# Patient Record
Sex: FEMALE | Race: BLACK OR AFRICAN AMERICAN | NOT HISPANIC OR LATINO | URBAN - METROPOLITAN AREA
[De-identification: names, ages, dates, MRNs, and addresses within clinical notes are randomized per-mention and may not be internally consistent; named-entity substitution may affect disease eponyms.]

---

## 2023-10-11 ENCOUNTER — EMERGENCY (EMERGENCY)
Facility: HOSPITAL | Age: 33
LOS: 0 days | Discharge: HOME | End: 2023-10-11
Attending: EMERGENCY MEDICINE
Payer: MEDICAID

## 2023-10-11 VITALS
DIASTOLIC BLOOD PRESSURE: 83 MMHG | WEIGHT: 164.91 LBS | TEMPERATURE: 98 F | SYSTOLIC BLOOD PRESSURE: 151 MMHG | RESPIRATION RATE: 17 BRPM | HEART RATE: 89 BPM | OXYGEN SATURATION: 100 %

## 2023-10-11 VITALS
SYSTOLIC BLOOD PRESSURE: 113 MMHG | HEART RATE: 87 BPM | OXYGEN SATURATION: 100 % | DIASTOLIC BLOOD PRESSURE: 74 MMHG | RESPIRATION RATE: 18 BRPM | TEMPERATURE: 98 F

## 2023-10-11 DIAGNOSIS — O26.891 OTHER SPECIFIED PREGNANCY RELATED CONDITIONS, FIRST TRIMESTER: ICD-10-CM

## 2023-10-11 DIAGNOSIS — R10.30 LOWER ABDOMINAL PAIN, UNSPECIFIED: ICD-10-CM

## 2023-10-11 DIAGNOSIS — O23.41 UNSPECIFIED INFECTION OF URINARY TRACT IN PREGNANCY, FIRST TRIMESTER: ICD-10-CM

## 2023-10-11 DIAGNOSIS — Z3A.01 LESS THAN 8 WEEKS GESTATION OF PREGNANCY: ICD-10-CM

## 2023-10-11 DIAGNOSIS — O26.851 SPOTTING COMPLICATING PREGNANCY, FIRST TRIMESTER: ICD-10-CM

## 2023-10-11 LAB
ALBUMIN SERPL ELPH-MCNC: 4 G/DL — SIGNIFICANT CHANGE UP (ref 3.5–5.2)
ALP SERPL-CCNC: 116 U/L — HIGH (ref 30–115)
ALT FLD-CCNC: 9 U/L — SIGNIFICANT CHANGE UP (ref 0–41)
ANION GAP SERPL CALC-SCNC: 8 MMOL/L — SIGNIFICANT CHANGE UP (ref 7–14)
APPEARANCE UR: CLEAR — SIGNIFICANT CHANGE UP
AST SERPL-CCNC: 15 U/L — SIGNIFICANT CHANGE UP (ref 0–41)
BACTERIA # UR AUTO: ABNORMAL /HPF
BASOPHILS # BLD AUTO: 0.05 K/UL — SIGNIFICANT CHANGE UP (ref 0–0.2)
BASOPHILS NFR BLD AUTO: 0.7 % — SIGNIFICANT CHANGE UP (ref 0–1)
BILIRUB DIRECT SERPL-MCNC: <0.2 MG/DL — SIGNIFICANT CHANGE UP (ref 0–0.3)
BILIRUB INDIRECT FLD-MCNC: >0.2 MG/DL — SIGNIFICANT CHANGE UP (ref 0.2–1.2)
BILIRUB SERPL-MCNC: 0.4 MG/DL — SIGNIFICANT CHANGE UP (ref 0.2–1.2)
BILIRUB UR-MCNC: NEGATIVE — SIGNIFICANT CHANGE UP
BLD GP AB SCN SERPL QL: SIGNIFICANT CHANGE UP
BUN SERPL-MCNC: 5 MG/DL — LOW (ref 10–20)
CALCIUM SERPL-MCNC: 9.6 MG/DL — SIGNIFICANT CHANGE UP (ref 8.4–10.4)
CAST: 1 /LPF — SIGNIFICANT CHANGE UP (ref 0–4)
CHLORIDE SERPL-SCNC: 102 MMOL/L — SIGNIFICANT CHANGE UP (ref 98–110)
CO2 SERPL-SCNC: 22 MMOL/L — SIGNIFICANT CHANGE UP (ref 17–32)
COLOR SPEC: YELLOW — SIGNIFICANT CHANGE UP
CREAT SERPL-MCNC: 0.8 MG/DL — SIGNIFICANT CHANGE UP (ref 0.7–1.5)
DIFF PNL FLD: NEGATIVE — SIGNIFICANT CHANGE UP
EGFR: 100 ML/MIN/1.73M2 — SIGNIFICANT CHANGE UP
EOSINOPHIL # BLD AUTO: 0.11 K/UL — SIGNIFICANT CHANGE UP (ref 0–0.7)
EOSINOPHIL NFR BLD AUTO: 1.6 % — SIGNIFICANT CHANGE UP (ref 0–8)
GLUCOSE SERPL-MCNC: 93 MG/DL — SIGNIFICANT CHANGE UP (ref 70–99)
GLUCOSE UR QL: NEGATIVE MG/DL — SIGNIFICANT CHANGE UP
HCG SERPL-ACNC: 6757 MIU/ML — HIGH
HCT VFR BLD CALC: 36.2 % — LOW (ref 37–47)
HGB BLD-MCNC: 12.6 G/DL — SIGNIFICANT CHANGE UP (ref 12–16)
HYALINE CASTS # UR AUTO: 1 /LPF — SIGNIFICANT CHANGE UP (ref 0–4)
IMM GRANULOCYTES NFR BLD AUTO: 0.3 % — SIGNIFICANT CHANGE UP (ref 0.1–0.3)
KETONES UR-MCNC: NEGATIVE MG/DL — SIGNIFICANT CHANGE UP
LEUKOCYTE ESTERASE UR-ACNC: ABNORMAL
LYMPHOCYTES # BLD AUTO: 2.14 K/UL — SIGNIFICANT CHANGE UP (ref 1.2–3.4)
LYMPHOCYTES # BLD AUTO: 30.9 % — SIGNIFICANT CHANGE UP (ref 20.5–51.1)
MCHC RBC-ENTMCNC: 29.6 PG — SIGNIFICANT CHANGE UP (ref 27–31)
MCHC RBC-ENTMCNC: 34.8 G/DL — SIGNIFICANT CHANGE UP (ref 32–37)
MCV RBC AUTO: 85.2 FL — SIGNIFICANT CHANGE UP (ref 81–99)
MONOCYTES # BLD AUTO: 0.64 K/UL — HIGH (ref 0.1–0.6)
MONOCYTES NFR BLD AUTO: 9.2 % — SIGNIFICANT CHANGE UP (ref 1.7–9.3)
NEUTROPHILS # BLD AUTO: 3.96 K/UL — SIGNIFICANT CHANGE UP (ref 1.4–6.5)
NEUTROPHILS NFR BLD AUTO: 57.3 % — SIGNIFICANT CHANGE UP (ref 42.2–75.2)
NITRITE UR-MCNC: NEGATIVE — SIGNIFICANT CHANGE UP
NRBC # BLD: 0 /100 WBCS — SIGNIFICANT CHANGE UP (ref 0–0)
PH UR: 7 — SIGNIFICANT CHANGE UP (ref 5–8)
PLATELET # BLD AUTO: 280 K/UL — SIGNIFICANT CHANGE UP (ref 130–400)
PMV BLD: 10.4 FL — SIGNIFICANT CHANGE UP (ref 7.4–10.4)
POTASSIUM SERPL-MCNC: 4.6 MMOL/L — SIGNIFICANT CHANGE UP (ref 3.5–5)
POTASSIUM SERPL-SCNC: 4.6 MMOL/L — SIGNIFICANT CHANGE UP (ref 3.5–5)
PROT SERPL-MCNC: 7.5 G/DL — SIGNIFICANT CHANGE UP (ref 6–8)
PROT UR-MCNC: NEGATIVE MG/DL — SIGNIFICANT CHANGE UP
RBC # BLD: 4.25 M/UL — SIGNIFICANT CHANGE UP (ref 4.2–5.4)
RBC # FLD: 12.6 % — SIGNIFICANT CHANGE UP (ref 11.5–14.5)
RBC CASTS # UR COMP ASSIST: 0 /HPF — SIGNIFICANT CHANGE UP (ref 0–4)
SODIUM SERPL-SCNC: 132 MMOL/L — LOW (ref 135–146)
SP GR SPEC: <1.005 — LOW (ref 1–1.03)
SQUAMOUS # UR AUTO: 7 /HPF — HIGH (ref 0–5)
UROBILINOGEN FLD QL: 0.2 MG/DL — SIGNIFICANT CHANGE UP (ref 0.2–1)
WBC # BLD: 6.92 K/UL — SIGNIFICANT CHANGE UP (ref 4.8–10.8)
WBC # FLD AUTO: 6.92 K/UL — SIGNIFICANT CHANGE UP (ref 4.8–10.8)
WBC UR QL: 10 /HPF — HIGH (ref 0–5)
YEAST-LIKE CELLS: PRESENT

## 2023-10-11 PROCEDURE — 99284 EMERGENCY DEPT VISIT MOD MDM: CPT

## 2023-10-11 PROCEDURE — 80076 HEPATIC FUNCTION PANEL: CPT

## 2023-10-11 PROCEDURE — 84702 CHORIONIC GONADOTROPIN TEST: CPT

## 2023-10-11 PROCEDURE — 85025 COMPLETE CBC W/AUTO DIFF WBC: CPT

## 2023-10-11 PROCEDURE — 87086 URINE CULTURE/COLONY COUNT: CPT

## 2023-10-11 PROCEDURE — 99284 EMERGENCY DEPT VISIT MOD MDM: CPT | Mod: 25

## 2023-10-11 PROCEDURE — 86901 BLOOD TYPING SEROLOGIC RH(D): CPT

## 2023-10-11 PROCEDURE — 86850 RBC ANTIBODY SCREEN: CPT

## 2023-10-11 PROCEDURE — 80048 BASIC METABOLIC PNL TOTAL CA: CPT

## 2023-10-11 PROCEDURE — 76830 TRANSVAGINAL US NON-OB: CPT

## 2023-10-11 PROCEDURE — 81001 URINALYSIS AUTO W/SCOPE: CPT

## 2023-10-11 PROCEDURE — 76830 TRANSVAGINAL US NON-OB: CPT | Mod: 26

## 2023-10-11 PROCEDURE — 86900 BLOOD TYPING SEROLOGIC ABO: CPT

## 2023-10-11 PROCEDURE — 36415 COLL VENOUS BLD VENIPUNCTURE: CPT

## 2023-10-11 RX ORDER — CEPHALEXIN 500 MG
1 CAPSULE ORAL
Qty: 14 | Refills: 0
Start: 2023-10-11 | End: 2023-10-17

## 2023-10-11 NOTE — ED PROVIDER NOTE - PATIENT PORTAL LINK FT
You can access the FollowMyHealth Patient Portal offered by [facility] by registering at the following website: [email or web address]. By joining [facility]’s FollowMyHealth portal, you will also be able to view your health information using other applications (apps) compatible with our system.

## 2023-10-11 NOTE — ED ADULT NURSE NOTE - OBJECTIVE STATEMENT
Pt is a 33 yr old F presented to the ER c/o of mid abdominal pain/cramps. Pt is pregnant. Unknown GAVIOTA. tested + urine 1 week ago. pt denied any vaginal bleeding or difficulty urinating.

## 2023-10-11 NOTE — ED PROVIDER NOTE - PHYSICAL EXAMINATION
VITAL SIGNS: I have reviewed nursing notes and confirm.  CONSTITUTIONAL: Well-developed; well-nourished; in no acute distress.  SKIN: Skin exam is warm and dry, no acute rash.  HEAD: Normocephalic; atraumatic.  EYES: Conjunctiva and sclera clear.  ENT: No nasal discharge; airway clear.  CARD: S1, S2 normal; no murmurs, gallops, or rubs. Regular rate and rhythm.  RESP: No wheezes, rales or rhonchi. Speaking in full sentences.   ABD: Normal bowel sounds; soft; non-distended; (+) mild suprapubic TTP; No rebound or guarding. No CVA tenderness.  EXT: Normal ROM. No clubbing, cyanosis or edema.  NEURO: Alert, oriented. Grossly unremarkable. No focal deficits.

## 2023-10-11 NOTE — ED PROVIDER NOTE - OBJECTIVE STATEMENT
33-year-old female with no significant past medical history, , LMP  presents to the ED complaining of lower abdominal cramping and vaginal spotting that has been ongoing over the last week.  Patient took pregnancy test 2 days ago that was positive.  She denies other complaints. Pt denies fever, chills, nausea, vomiting, diarrhea, headache, dizziness, weakness, chest pain, SOB, back pain, LOC, trauma, urinary symptoms, cough, calf pain/swelling, recent travel, recent surgery.

## 2023-10-11 NOTE — ED PROVIDER NOTE - NSFOLLOWUPINSTRUCTIONS_ED_ALL_ED_FT
Urinary Tract Infection    A urinary tract infection (UTI) is an infection of any part of the urinary tract, which includes the kidneys, ureters, bladder, and urethra. Risk factors include ignoring your need to urinate, wiping back to front if female, being an uncircumcised male, and having diabetes or a weak immune system. Symptoms include frequent urination, pain or burning with urination, foul smelling urine, cloudy urine, pain in the lower abdomen, blood in the urine, and fever. If you were prescribed an antibiotic medicine, take it as told by your health care provider. Do not stop taking the antibiotic even if you start to feel better.    SEEK IMMEDIATE MEDICAL CARE IF YOU HAVE THE FOLLOWING SYMPTOMS: severe back or abdominal pain, inability to keep fluids or medicine down, dizziness/lightheadedness, or a change in mental status.      Pregnancy    WHAT YOU NEED TO KNOW:    A normal pregnancy lasts about 40 weeks. The first trimester lasts from your last period through the 12th week of pregnancy. The second trimester lasts from the 13th week of your pregnancy through the 23rd week. The third trimester lasts from your 24th week of pregnancy until your baby is born. If you know the date of your last period, your healthcare provider can estimate your due date. You may give birth to your baby any time from 37 weeks to 2 weeks after your due date.    DISCHARGE INSTRUCTIONS:    Return to the emergency department if:     You develop a severe headache that does not go away.      You have new or increased vision changes, such as blurred or spotted vision.      You have new or increased swelling in your face or hands.      You have pain or cramping in your abdomen or low back.      You have vaginal bleeding.    Contact your healthcare provider or obstetrician if:     You have abdominal cramps, pressure, or tightening.      You have a change in vaginal discharge.      You cannot keep food or drinks down, and you are losing weight.      You have chills or a fever.      You have vaginal itching, burning, or pain.       You have yellow, green, white, or foul-smelling vaginal discharge.      You have pain or burning when you urinate, less urine than usual, or pink or bloody urine.      You have questions or concerns about your condition or care.    Medicines:     Prenatal vitamins provide some of the extra vitamins and minerals you need during pregnancy. Prenatal vitamins may also help to decrease the risk of certain birth defects.       Take your medicine as directed. Contact your healthcare provider if you think your medicine is not helping or if you have side effects. Tell him or her if you are allergic to any medicine. Keep a list of the medicines, vitamins, and herbs you take. Include the amounts, and when and why you take them. Bring the list or the pill bottles to follow-up visits. Carry your medicine list with you in case of an emergency.    Follow up with your healthcare provider or obstetrician as directed: Go to all of your prenatal visits during your pregnancy. Write down your questions so you remember to ask them during your visits.    Body changes that may occur during your pregnancy:     Breast changes you will experience include tenderness and tingling during the early part of your pregnancy. Your breasts will become larger. You may need to use a support bra. You may see a thin, yellow fluid, called colostrum, leak from your nipples during the second trimester. Colostrum is a liquid that changes to milk about 3 days after you give birth.      Skin changes and stretch marks may occur during your pregnancy. You may have red marks, called stretch marks, on your skin. Stretch marks will usually fade after pregnancy. Use lotion if your skin is dry and itchy. The skin on your face, around your nipples, and below your belly button may darken. Most of the time, your skin will return to its normal color after your baby is born.       Morning sickness is nausea and vomiting that can happen at any time of day. Avoid fatty and spicy foods. Eat small meals throughout the day instead of large meals. Delilah may help to decrease nausea. Ask your healthcare provider about other ways of decreasing nausea and vomiting.      Heartburn may be caused by changes in your hormones during pregnancy. Your growing uterus may also push your stomach upward and force stomach acid to back up into your esophagus. Eat 4 or 5 small meals each day instead of large meals. Avoid spicy foods. Avoid eating right before bedtime.      Constipation may develop during your pregnancy. To treat constipation, eat foods high in fiber such as fiber cereals, beans, fruits, vegetables, whole-grain breads, and prune juice. Get regular exercise and drink plenty of water. Your healthcare provider may also suggest a fiber supplement to soften your bowel movements. Talk to your healthcare provider before you use any medicines to decrease constipation.      Hemorrhoids are enlarged veins in the rectal area. They may cause pain, itching, and bright red bleeding from your rectum. To decrease your risk of hemorrhoids, prevent constipation and do not strain to have a bowel movement. If you have hemorrhoids, soak in a tub of warm water to ease discomfort. Ask your healthcare provider how you can treat hemorrhoids.       Leg cramps and swelling may be caused by low calcium levels or the added weight of pregnancy. Raise your legs above the level of your heart to decrease swelling. During a leg cramp, stretch or massage the muscle that has the cramp. Heat may help decrease pain and muscle spasms. Apply heat on your muscle for 20 to 30 minutes every 2 hours for as many days as directed.      Back pain may occur as your baby grows. Do not stand for long periods of time or lift heavy items. Use good posture while you stand, squat, or bend. Wear low-heeled shoes with good support. Rest may also help to relieve back pain. Ask your healthcare provider about exercises you can do to strengthen your back muscles.     Stay healthy during your pregnancy:     Eat a variety of healthy foods. Healthy foods include fruits, vegetables, whole-grain breads, low-fat dairy foods, beans, lean meats, and fish. Drink liquids as directed. Ask how much liquid to drink each day and which liquids are best for you. Limit caffeine to less than 200 milligrams each day. Limit your intake of fish to 2 servings each week. Choose fish low in mercury such as canned light tuna, shrimp, crab, salmon, cod, or tilapia. Do not eat fish high in mercury such as swordfish, tilefish, brunilda mackerel, and shark.       Take prenatal vitamins as directed. Your need for certain vitamins and minerals, such as folic acid, increases during pregnancy. Prenatal vitamins provide some of the extra vitamins and minerals you need. Prenatal vitamins may also help to decrease the risk of certain birth defects.       Ask how much weight you should gain during your pregnancy. Too much or too little weight gain can be unhealthy for you and your baby.       Talk to your healthcare provider about exercise. Moderate exercise can help you stay fit. Your healthcare provider will help you plan an exercise program that is safe for you during pregnancy.       Do not smoke. Smoking increases your risk of a miscarriage and other health problems during your pregnancy. Smoking can cause your baby to be born too early or weigh less at birth. Quit smoking as soon as you think you might be pregnant. Ask your healthcare provider for information if you need help quitting.      Do not drink alcohol. Alcohol passes from your body to your baby through the placenta. It can affect your baby's brain development and cause fetal alcohol syndrome (FAS). FAS is a group of conditions that causes mental, behavior, and growth problems.       Talk to your healthcare provider before you take any medicines. Many medicines may harm your baby if you take them when you are pregnant. Do not take any medicines, vitamins, herbs, or supplements without first talking to your healthcare provider. Never use illegal or street drugs (such as marijuana or cocaine) while you are pregnant.     Safety tips:     Avoid hot tubs and saunas. Do not use a hot tub or sauna while you are pregnant, especially during your first trimester. Hot tubs and saunas may raise your baby's temperature and increase the risk of birth defects.      Avoid toxoplasmosis. This is an infection caused by eating raw meat or being around infected cat feces. It can cause birth defects, miscarriages, and other problems. Wash your hands after you touch raw meat. Make sure any meat is well-cooked before you eat it. Avoid raw eggs and unpasteurized milk. Use gloves or ask someone else to clean your cat's litter box while you are pregnant.       Ask your healthcare provider about travel. The most comfortable time to travel is during the second trimester. Ask your healthcare provider if you can travel after 36 weeks. You may not be able to travel in an airplane after 36 weeks. He may also recommend that you avoid long road trips.

## 2023-10-11 NOTE — ED PROVIDER NOTE - NSFOLLOWUPCLINICS_GEN_ALL_ED_FT
[facility]  OB/GYN  [address] [place] [address]  Phone: [phone]  Fax:

## 2023-10-11 NOTE — ED PROVIDER NOTE - CLINICAL SUMMARY MEDICAL DECISION MAKING FREE TEXT BOX
33-year-old female G1, P0 LMP August 30 presenting for evaluation of pelvic cramping.  States that she has also had spotting several weeks ago.  Not currently experiencing vaginal bleeding.  No other acute complaints.  Well appearing, NAD, non toxic. NCAT PERRLA EOMI neck supple non tender normal wob abdomen s nt nd no rebound no guarding WWPx4 neuro non focal.  Labs and imaging reviewed.  Patient with GYN follow-up on November 8.  Aware of all results, given a copy of all available results, comfortable with discharge and follow-up outpatient, strict return precautions given. Endorses understanding and aware they can return at any time for further evaluation. No further questions or concerns at this time.

## 2023-10-11 NOTE — ED PROVIDER NOTE - NS ED ATTENDING STATEMENT MOD
This was a shared visit with the MERVAT. I reviewed and verified the documentation and independently performed the documented:

## 2023-10-11 NOTE — ED ADULT NURSE NOTE - NSFALLUNIVINTERV_ED_ALL_ED
Bed/Stretcher in lowest position, wheels locked, appropriate side rails in place/Call bell, personal items and telephone in reach/Instruct patient to call for assistance before getting out of bed/chair/stretcher/Non-slip footwear applied when patient is off stretcher/[place] to call system/Physically safe environment - no spills, clutter or unnecessary equipment/Purposeful proactive rounding/Room/bathroom lighting operational, light cord in reach

## 2023-10-14 LAB
CULTURE RESULTS: SIGNIFICANT CHANGE UP
SPECIMEN SOURCE: SIGNIFICANT CHANGE UP

## 2023-11-09 LAB
CFTR MUT ANL BLD/T: NEGATIVE
EXTERNAL ABO GROUPING: NORMAL
EXTERNAL ANTIBODY SCREEN: NORMAL
EXTERNAL CHLAMYDIA SCREEN: NEGATIVE
EXTERNAL GONORRHEA SCREEN: NEGATIVE
EXTERNAL HEMATOCRIT: 38.3 %
EXTERNAL HEMOGLOBIN: 13.5 G/DL
EXTERNAL HEPATITIS B SURFACE ANTIGEN: NON REACTIVE
EXTERNAL HIV-1/2 AB-AG: NORMAL
EXTERNAL PLATELET COUNT: 262 K/ÂΜL
EXTERNAL RH FACTOR: POSITIVE
EXTERNAL RUBELLA IGG QUANTITATION: 2.2
EXTERNAL SYPHILIS TOTAL IGG/IGM SCREENING: NON REACTIVE
HCV AB SER-ACNC: NEGATIVE

## 2024-01-11 ENCOUNTER — OFFICE VISIT (OUTPATIENT)
Dept: OBGYN CLINIC | Facility: CLINIC | Age: 34
End: 2024-01-11

## 2024-01-11 VITALS
WEIGHT: 175.8 LBS | SYSTOLIC BLOOD PRESSURE: 122 MMHG | HEIGHT: 64 IN | HEART RATE: 80 BPM | DIASTOLIC BLOOD PRESSURE: 80 MMHG | BODY MASS INDEX: 30.01 KG/M2 | OXYGEN SATURATION: 99 %

## 2024-01-11 DIAGNOSIS — Z3A.19 19 WEEKS GESTATION OF PREGNANCY: ICD-10-CM

## 2024-01-11 DIAGNOSIS — Z34.02 ENCOUNTER FOR SUPERVISION OF LOW-RISK FIRST PREGNANCY IN SECOND TRIMESTER: Primary | ICD-10-CM

## 2024-01-11 PROCEDURE — PNV: Performed by: PHYSICIAN ASSISTANT

## 2024-01-11 NOTE — PROGRESS NOTES
Patient is a transfer at 19w5d from [place].     Patient doing well without c/o.       Patient says her LOREE is 24 based on LMP of 24.   Office notes say LOREE 24.   US from 23 says LOREE 24 confirmed.   Will need to get records from 23 dating scan.    Dating scan: patient says she had dating scan 23, no report in records patient brought to appt today.  PNL: completed 23, result console was undated  Gonorrhea/ chlamydia culture: negative  Urine culture: negative  Genetic screening/ carrier screening: patient says she had testing.  Will send copy of resuts.  MsAFP: no result in records  Flu vaccine: declines  US 23: CRL confirms dating 24.  Fibroids noted: Anterior intramural- 3.38 x 2.73 x 2.03 cm  Left lateral subserosal- 4.68 x 3.66 x 2.83 cm    FOB and support person:  Romulo Bermeo      Current Outpatient Medications on File Prior to Visit   Medication Sig Dispense Refill    Prenatal Vit-Fe Fumarate-FA (PRENATAL VITAMIN AND MINERAL PO) Take by mouth      [DISCONTINUED] Ferrous Sulfate (IRON PO) Take 1 tablet by mouth daily       No current facility-administered medications on file prior to visit.       Pre- Vitals      Flowsheet Row Most Recent Value   Prenatal Assessment    Prenatal Vitals    Blood Pressure 122/80   Weight - Scale 79.7 kg (175 lb 12.8 oz)   Urine Albumin/Glucose    Dilation/Effacement/Station    Vaginal Drainage    Edema            Review of Systems   Constitutional: Negative.    HENT: Negative.    Eyes: Negative.    Respiratory: Negative.    Cardiovascular: Negative.    Gastrointestinal: Negative.    Endocrine: Negative.    Genitourinary:        As noted in HPI   Musculoskeletal: Negative.    Skin: Negative.    Allergic/Immunologic: Negative.    Neurological: Negative.    Hematological: Negative.    Psychiatric/Behavioral: Negative.             Physical Exam  Constitutional:       General: She is not in acute distress.     Appearance: She is  well-developed.   Abdominal:      Palpations: Abdomen is soft.      Tenderness: There is no abdominal tenderness. There is no guarding.   Neurological:      Mental Status: She is alert and oriented to person, place, and time.   Skin:     General: Skin is warm and dry.   Psychiatric:         Behavior: Behavior normal.        Fundal height: 19  Fetal Heart Rate: 148    Problem List          Genitourinary    Fibroid uterus    Overview     Anterior intramural- 3.38 x 2.73 x 2.03 cm  Left lateral subserosal- 4.68 x 3.66 x 2.83 cm            Other    Supervision of low-risk first pregnancy    Overview     Transfer from [facility] at 19 weeks  NIPT : low risk  Carrier screen: awaiting records from pt         19 weeks gestation of pregnancy    Herpes    Overview     Will need to start valtrex @ 36 weeks                    Ob visit in 4 weeks  Referral sent to [facility] for anatomy scan  MsAFP ordered  Patient to send me copy of panorama and carrier testing results  Will review records and reach out to patient with any concerns

## 2024-01-11 NOTE — PATIENT INSTRUCTIONS
"Cystic Fibrosis: Cystic Fibrosis is the most common inherited disease of children and young adults.  The carrier frequency is 1 in 24, to 1 in 97.  Both parents need to be carriers for a child to be affected (25% chance).  One in 3500, children born are affected.  Cystic fibrosis is a disorder of mucus production and produces abnormally thick mucus leading to life threatening lung infections, digestion problems, poor growth, infertility, and more.  Symptoms range from mild to severe, but individuals with severe disease may die in childhood.  With treatments today, people with Cystic Fibrosis can live into their 30’s.  CF does not affect intelligence.  Recommended follow up to a positive result is testing of the baby’s father.    CPT Code: 01327     Spinal Muscular Atrophy (SMA): SMA is the most common inherited cause of early childhood death.  The carrier frequency is 1 in 47 to 1 in 72 in the [place] and both parents need to be carriers for a child to be affected (25% chance).  1 in 11,000 children are affected.  SMA is a progressive degeneration of lower motor neurons.  Muscle weakness is the most common type with respiratory failure by the age of 2 years old.  Muscles responsible for crawling, walking, swallowing, and head and neck control are most severely affected.  Recommended follow up to a positive result is testing of the baby’s father.    CPT Code: 39047     Again, congratulations on your pregnancy!  NEXT STEPS  Get Prenatal bloodwork if not already done  Call [facility] to schedule next ultrasound (done 12-13 wks)   Contact information for [facility]  Center (AKA Maternal Fetal Medicine)- Main Number [phone]   Return to our office in 4 weeks for routine prenatal visit (or sooner if any problems/concerns arise- see packet for things to report)  Check out [facility] website to read the \"Pregnancy Essentials Guide\"      It can be found by going to [email or web address]-->select services-->select women's health-->select " Obstetrics  [email or web address]  ----------------------------------------------------  Hospital Affiliation & Directions    [address] [address]    Warning Signs During Pregnancy  The list below includes warning signs your providers would like you to be aware of.  If you experience any of these at any time during your pregnancy, please call us as soon as possible.     Vaginal bleeding   Sharp abdominal pain that does not go away   Fever (more than 100.4?F and is not relieved with Tylenol)   Persistent vomiting lasting greater than 24 hours   Chest pain/Shortness of breath   Pain or burning when you urinate    Call the OFFICE [phone] for any questions/emergencies.  At night or on the weekend, calls go through a triage service, please indicate it is an emergency and the DOCTOR on call will be paged.  ---------------------------------------------------------------    Discomforts of Early Pregnancy  Tips for coping with nausea and vomiting during pregnancy   Eat meals and snacks slowly   Eat every 1-2 hours to avoid a full stomach   Don’t skip meals, avoid empty stomach   Eat a snack prior to getting out of bed   Avoid food and beverages with a strong aroma   Avoid dehydration - drink enough fluid to keep the urine pale yellow   Drink fluids before a meal to minimize the effect of a full stomach   Limit the amount of coffee and beverages that contain caffeine   Eliminate spicy, odorous, high fat (fried foods), acidic (tomato products) and sweet foods   Fluids that contain lemon (lemonade), mint (tea) or orange can usually be well tolerated   Snacks and meals that contain low-fat protein (lean meats, fish, poultry and eggs) along with eating easily digestible carbs (fruit, rice, toast, crackers and dry cereal) may be tolerated better   Foods with ginger may be well tolerated. May use ginger root powder, capsules or  extract (up to 1000 mg per day)   Drink liquids in small amounts    If symptoms persist, please contact your provider.      Tips for coping with constipation during pregnancy   Increase fiber and fluids.  - Drink 8-10 cups of liquid, like water or low-sugar juice daily  - Keep urine pale with fluids (water, milk), fruit and vegetables   Eat a well-balanced diet that contains high fiber food (fruits, vegetables, whole grain breads and cereals, bran and dried beans)   Take a 30-minute walk daily   You may take a mild stool softener such as Colace®    If symptoms persist, please contact your provider.      For any emergencies, PLEASE CALL THE OFFICE at [phone]. If the office is closed, the doctor on call will be paged by the answering service.    Medications and Pregnancy- see handout in packetExpected Weight Gain During Pregnancy  If you have a healthy BMI (18-25) prior to pregnancy:  The recommended weight gain is between 25-35 pounds. Approximate weight gain  in the first trimester is 1-4.5 pounds. An expected weight gain during the second and  third trimester is approximately one pound per week.  If you have a BMI of less than 18 prior to pregnancy,  you are considered underweight:  The recommended weight gain is between 28-40 pounds. Approximate weight gain  in the first trimester is 1-4.5 pounds. An expected weight gain during the second and  third trimester is just over one pound per week.  If your BMI is 25 to 29.9: you are considered overweight:  You should gain 1/2 to 2/3 pound during the second and third trimester, for a total  weight gain of 15 to 25 pounds.  If you have a BMI of greater than 30 prior to pregnancy,  you are considered overweight:  The recommended weight gain is between 15-25 pounds. Approximate weight gain  in the first trimester is 1-4.5 pounds. An expected weight gain during the second  and third trimester is approximately 0.5 pound per week.    Foods to avoid during pregnancy:    Unpasteurized milk, juice and cheese  - Soft cheeses like feta or brie (if made with UNPASTEURIZED milk)   Unheated deli meats like lunchmeat and hotdogs   Undercooked poultry, beef, pork, seafood including raw sushi    What fish is safe to eat during pregnancy?  Eat 8 to 12 ounces of fish a week. Pick from this group frequently, especially if you follow  the American Heart Association’s recommendation to eat fish at least 2 times a week.    AVOID HIGH MERCURY FISH  A single meal of the following fish can put  you over the Environmental Protection  Agency’s safe limit for the month.  High mercury fish:  Shark  Swordfish  Jacek Mackerel  Tile Fish    Caffeine and Pregnancy  The March  and American College of Obstetrics and Gynecologists (ACOG) urge pregnant  women to limit their caffeine consumption to no more than 200 milligrams (mg) per day. This is  comparable to having one 12-ounce cup of coffee a day. This level has been shown not to increase risk  of miscarriage, growth or  labor complications. Effects of higher levels are not known.    Exercise During Pregnancy  A daily exercise program that consists of 30 minutes a day is recommended.   Low impact exercises like walking and swimming are great exercises throughout  all of pregnancy   If you’re an avid strength  avoid lifting very heavy weights - nothing more  than 30 pounds    Drink plenty of fluids while exercising to stay hydrated.  Be careful to avoid overheating.    ACTIVITIES TO AVOID   Exercises that can make you lose your balance.   Activities that can put your baby at risk i.e. horseback riding, scuba diving, skiing  or snowboarding. Any other sport that puts you at risk for getting hit in the  abdominal area.   Do not use saunas, steam rooms or hot tubs (that have a higher temperature  than 100F)   After the first trimester, avoid exercises that require you to lay flat on your back.   Avoid exceeding a heart rate greater than 140  beats per minute. As long as you are  able to hold a conversation while exercising your heart rate is likely acceptable    Vaccines and Pregnancy    Information for pregnant women  Vaccines help protect you and your baby against serious diseases.  Whooping Cough Vaccine  Whooping cough (or pertussis) can be serious for anyone, but for your , it can be lifethreatening.  Up to 20 babies die each year in the [place] due to whooping cough.   When you get the whooping cough vaccine during your pregnancy, your body will create protective  antibodies and pass some of them to your baby before birth. These antibodies will provide your  baby some short-term, early protection against whooping cough.  Learn more at www.cdc.gov/pertussis/pregnant/.    Flu Vaccine  Changes in your immune, heart, and lung functions during pregnancy make you more likely to get  seriously ill from the flu. Catching the flu also increases your chances for serious problems for your  developing baby, including premature labor and delivery. Get the flu shot if you are pregnant during  flu season--it’s the best way to protect yourself and your baby for several months after birth from flu-related  complications.  Flu seasons vary in their timing from season to season, but CDC recommends getting vaccinated  by the end of October, if possible. This timing helps protect you before flu activity begins to increase.  Find more on how to prevent the flu by visiting www.cdc.gov/flu/.    Covid Vaccine  Similar to the flu, Changes in your immune, heart, and lung functions during pregnancy make you more likely to get  seriously ill from COVID. It  also increases your chances for serious problems for your  developing baby, including premature labor and delivery.  Please consider getting your covid vaccine if you haven't already. This is endorsed by both ACOG- American College of Obstetrics and Gynecology and SMFM- Society of Maternal Fetal  Medicine    Fetal Activity  You will most likely start to feel your baby move (also known as quickening) between  18 and 20 weeks of pregnancy. First time moms might feel their baby’s movements  closer to 25 weeks while a second time mom might feel those first movements closer  to 16 weeks.

## 2024-01-12 DIAGNOSIS — Z11.1 TUBERCULOSIS SCREENING: Primary | ICD-10-CM

## 2024-01-12 PROBLEM — D25.9 FIBROID UTERUS: Status: ACTIVE | Noted: 2024-01-12

## 2024-01-12 PROBLEM — Z78.9 NOT IMMUNE TO MEASLES: Status: ACTIVE | Noted: 2024-01-12

## 2024-01-12 PROBLEM — Z3A.19 19 WEEKS GESTATION OF PREGNANCY: Status: ACTIVE | Noted: 2024-01-12

## 2024-01-12 PROBLEM — Z34.00 SUPERVISION OF LOW-RISK FIRST PREGNANCY: Status: ACTIVE | Noted: 2024-01-12

## 2024-01-12 PROBLEM — R89.9 ABNORMAL LABORATORY TEST: Status: ACTIVE | Noted: 2024-01-12

## 2024-01-12 PROBLEM — B00.9 HERPES: Status: ACTIVE | Noted: 2024-01-12

## 2024-01-15 ENCOUNTER — TELEMEDICINE (OUTPATIENT)
Age: 34
End: 2024-01-15

## 2024-01-15 DIAGNOSIS — Z3A.20 20 WEEKS GESTATION OF PREGNANCY: ICD-10-CM

## 2024-01-15 DIAGNOSIS — Z34.02 ENCOUNTER FOR SUPERVISION OF LOW-RISK FIRST PREGNANCY IN SECOND TRIMESTER: Primary | ICD-10-CM

## 2024-01-15 DIAGNOSIS — R89.9 ABNORMAL LABORATORY TEST: ICD-10-CM

## 2024-01-15 PROCEDURE — OBC: Performed by: PHYSICIAN ASSISTANT

## 2024-01-15 NOTE — PROGRESS NOTES
The patient was identified by name and date of birth. The patient was informed that this is a telemedicine visit and that the visit is being conducted through [employer] and patient was informed this is a secure, HIPAA-complaint platform. She agrees to proceed..  My office door was closed. No one else was in the room.  She acknowledged consent and understanding of privacy and security of the video platform. The patient has agreed to participate and understands they can discontinue the visit at any time.    Patient location:  [place]  Contact phone number in case call is disconnected: confirmed      33 y.o. y/o female here for OB intake.     Patient was a transfer to [facility] at 19 weeks of pregnancy.    TV u/s done 23 confirms SLIUP  consistent with 23 LMP, final EDC 24 by LMP.           Current Outpatient Medications on File Prior to Visit   Medication Sig Dispense Refill    Prenatal Vit-Fe Fumarate-FA (PRENATAL VITAMIN AND MINERAL PO) Take by mouth       No current facility-administered medications on file prior to visit.       No past surgical history on file.    Past Medical History:   Diagnosis Date    Fibroids     Herpes          Genetic screen:  Canavan disease- negative  Cerebral palsy- negative  Cleft lip/palate- negative  Congenital anomalies- negative  Congenital heart disease- negative  Consanguinity- negative  Cystic fibrosis- negative  Down's syndrome- negative  Hemophilia- negative  Skamania's chorea- negative  Mental retardation/ intellectual disability/ cognitive delays- negative  Muscular dystrophy- negative  Neural tube defect- negative  Sickle cell anemia- negative  Adriano-sachs disease- negative  Fragile X- negative  Thalassemia- negative  Autism- negative  Type 1 diabetes- negative  PKU- negative  Premature ovarian failure- negative      OB History          1    Para        Term                AB        Living             SAB        IAB        Ectopic         Multiple        Live Births                     Previous pregnancy history/ complications: n/a    Age of patient: 33  Age of father of the baby: 36, Romulo Bermeo     Exposure risk:  Occupation: unemployed  Exposure chemicals/radiation:no  Alcohol exposure: no  Medications taking since LMP:cough medicine  Drug exposure:no  Smoker: no  Cat litter exposure: no    Depression screen:    Postpartum Depression: Low Risk  (1/15/2024)    [place]  Depression Scale     Last EPDS Total Score: 3     Last EPDS Self Harm Result: Never       Domestic violence screen: negative      TB screening:   HIV-no  Close contact with individuals with known or suspected TB-no  Medical conditions known to increase risk of disease if infected  Ie. Diabetes, lupus, cancer, alcoholism, drug addiction- no  Birth in or emigration from high prevalent countries ([place], [place], [place], [place], [place], [place], [place], [place], [place]- no  Medically underserved- no  Homeless- no  Living or working in long term care facilities - no    DRUG screening:  Parents:  Did any of your parents have a problem with alcohol or other drug use?  no    Partner: Does your partner have a problem with alcohol or drug use? no    Past: In the past, have you had difficulties in your life because of alcohol or other drugs, including prescription medication?  no    Present: In the past month have you drunk any alcohol or used other drugs?  no    Peers: Do any of your peers (friends, roommates, co-workers, etc) have a problem with alcohol or drug use? no    Cravings:  During this pregnancy, have you experienced cravings for substances such as opioids or methamphetamines? no    Substitute: During this pregnancy, have you taken or purchased buprenorphine (subutex or suboxone) that was prescribed to someone else? no    Thyroid disease risk:  BMI >30: no  Type 1 diabetes: no  Fhx or personal hx of thyroid disease: no    Diabetes risk screening:    BMI 30 or greater: no  Hx of macrosomia ( infant weight 9 lb or greater): no  Previous GDM hx: no  Hx PCOS or insulin resistance: no  Hx of elevated HgbA1c, glucose tolerance, fasting glucose: no    HTN: no  Hx elevated HDL/ triglycerides:no  1st degree relative with diabetes: no  Hx cardiovascular ds: no  , , ,  american, : yes      Glucola ordered: no    Other screening:  Ashkenazi Amish/ Czech Jordanian/ Cajun descent: no, [facility] screening offered: no    Hx of gastric bypass/ gastric sleeve/ gastric band: no    Hx of HSV for patient or partner: yes    Hx of MRSA: no    Last pap: 10/2023  Hx of abnormal pap smear: no  Hx of procedures to the cervix: no    Hx of chlamydia/ gonorrhea/ PID: hx of chlamydia > 5 years    Hx recurrent pregnancy loss/ stillbirth: no    Was this pregnancy a result of infertility treatment: no    Vaccine Hx:  Varicella: + immunity  Flu vaccine: declines  Hep B vaccine: not sure   COVID vaccine :  completed x 2    Hx of covid in last 3 months: no    ASA/ PEC screen:  Previous uncomplicated full-term delivery: no  Multifetal gestation- 2 points: 0  Chronic HTN- 2 points: 0  Type 1 or 2 pre-gestational diabetes- 2 points: 0  Renal disease- 2 points: 0  Autoimmune disease- 2 points: 0  History of preeclampsia- 2 points: 0  IVF pregnancy- 1 point: 0  >10 year pregnancy interval-1 point: 0  Previous pregnancy with IUGR/ SGA/ adverse pregnancy outcome-1 point: 0  Nulliparity- 1 point: 1  BMI >30- 1 point: 0  Family history of preeclampsia in mother or sister- 1 point: 0  Age >or = 35- 1 point: 0   Race- 1 point: 1  Low socioeconomic status-1 point: 0    TOTAL SCORE: 2    Pertinent + Pre eclampsia risk factors: nulliparity,       Other:  Pre pregnancy weight: 160lb    Ok with blood transfusion if needed: yes    Plans for feeding baby: yes    Blood type: O positive      PROBLEM LIST  includes:    Indeterminate quantiferon TB:   repeat test  Herpes:   plan to start valtrex @36 wks  Rubeola nonimmune: will need postpartum vaccine  Uterine fibroids:  recheck with anatomy scan      -Pregnancy: H&P completed today.       -Genetic testing: Patient had low risk NIPT    -Carrier screening including Cystic fibrosis and spinal muscular atrophy reviewed: patient had a negative carrier panel completed.    -Reviewed benefits of influenza vaccination in pregnancy including but not limited to reduction in maternal influenza hospitalization, reduction in risk of stillbirth, and reduction in influenza-related morbidity and mortality among infants. Reviewed safety of influenza vaccine in pregnancy and overall very low risk of reaction or adverse effects. Patient voiced understanding of all this. Patient declines

## 2024-01-18 ENCOUNTER — TELEPHONE (OUTPATIENT)
Dept: PERINATAL CARE | Facility: CLINIC | Age: 34
End: 2024-01-18

## 2024-01-18 NOTE — TELEPHONE ENCOUNTER
Called [employer] @ # [phone] to set up [facility] transportation for patient's Maternal Fetal Medicine appointment.  Appointment scheduled on  January 19, 2024 at 10:45 AM at our [place] office.       Spoke with patient and explained [facility] will pick-up patient at 9:20 AM for Maternal Fetal Medicine appointment.   Patient instructed she has 5 minutes maximum to get into car upon arrival. Patient verbalized understanding.     Confirmed phone and address.   [phone] [address] [facility] [address]

## 2024-01-18 NOTE — TELEPHONE ENCOUNTER
Patient called [facility] office to confirm location of office for visit tomorrow. Provided address and directions, confirmed with [facility] services were called.

## 2024-01-19 ENCOUNTER — APPOINTMENT (OUTPATIENT)
Dept: LAB | Facility: CLINIC | Age: 34
End: 2024-01-19
Payer: COMMERCIAL

## 2024-01-19 ENCOUNTER — TELEPHONE (OUTPATIENT)
Age: 34
End: 2024-01-19

## 2024-01-19 ENCOUNTER — TELEPHONE (OUTPATIENT)
Dept: OBGYN CLINIC | Facility: CLINIC | Age: 34
End: 2024-01-19

## 2024-01-19 ENCOUNTER — ROUTINE PRENATAL (OUTPATIENT)
Dept: PERINATAL CARE | Facility: CLINIC | Age: 34
End: 2024-01-19
Payer: COMMERCIAL

## 2024-01-19 VITALS
SYSTOLIC BLOOD PRESSURE: 116 MMHG | DIASTOLIC BLOOD PRESSURE: 84 MMHG | HEART RATE: 86 BPM | BODY MASS INDEX: 30.08 KG/M2 | HEIGHT: 64 IN | WEIGHT: 176.2 LBS

## 2024-01-19 DIAGNOSIS — Z36.86 ENCOUNTER FOR ANTENATAL SCREENING FOR CERVICAL LENGTH: ICD-10-CM

## 2024-01-19 DIAGNOSIS — D25.9 UTERINE LEIOMYOMA, UNSPECIFIED LOCATION: ICD-10-CM

## 2024-01-19 DIAGNOSIS — Z3A.19 19 WEEKS GESTATION OF PREGNANCY: ICD-10-CM

## 2024-01-19 DIAGNOSIS — Z36.3 ENCOUNTER FOR ANTENATAL SCREENING FOR MALFORMATION: Primary | ICD-10-CM

## 2024-01-19 DIAGNOSIS — Z11.1 TUBERCULOSIS SCREENING: ICD-10-CM

## 2024-01-19 DIAGNOSIS — Z3A.20 20 WEEKS GESTATION OF PREGNANCY: ICD-10-CM

## 2024-01-19 PROCEDURE — 36415 COLL VENOUS BLD VENIPUNCTURE: CPT

## 2024-01-19 PROCEDURE — 76805 OB US >/= 14 WKS SNGL FETUS: CPT | Performed by: STUDENT IN AN ORGANIZED HEALTH CARE EDUCATION/TRAINING PROGRAM

## 2024-01-19 PROCEDURE — 82105 ALPHA-FETOPROTEIN SERUM: CPT

## 2024-01-19 PROCEDURE — 99203 OFFICE O/P NEW LOW 30 MIN: CPT | Performed by: STUDENT IN AN ORGANIZED HEALTH CARE EDUCATION/TRAINING PROGRAM

## 2024-01-19 PROCEDURE — 76817 TRANSVAGINAL US OBSTETRIC: CPT | Performed by: STUDENT IN AN ORGANIZED HEALTH CARE EDUCATION/TRAINING PROGRAM

## 2024-01-19 PROCEDURE — 86480 TB TEST CELL IMMUN MEASURE: CPT

## 2024-01-19 NOTE — PROGRESS NOTES
"[facility]: Ms. Tristan was seen today for anatomic survey and cervical length screening ultrasound.  See ultrasound report under \"OB Procedures\" tab.        Physical Exam  Constitutional:       General: She is not in acute distress.     Appearance: Normal appearance.   HENT:      Head: Normocephalic and atraumatic.   Eyes:      Extraocular Movements: Extraocular movements intact.   Cardiovascular:      Rate and Rhythm: Normal rate.   Pulmonary:      Effort: Pulmonary effort is normal. No respiratory distress.   Skin:     Findings: No erythema or rash.   Neurological:      Mental Status: She is alert and oriented to person, place, and time.   Psychiatric:         Mood and Affect: Mood normal.         Behavior: Behavior normal.         Please don't hesitate to contact our office with any concerns or questions.  -Mel Kee MD      "

## 2024-01-19 NOTE — PROGRESS NOTES
Ultrasound Probe Disinfection    A transvaginal ultrasound was performed.   Prior to use, disinfection was performed with High Level Disinfection Process (VitalsGuard).  Probe serial number [id number]: [id number] was used.      Sharita Henson  01/19/24  11:50 AM

## 2024-01-19 NOTE — LETTER
"2024     Giovanni Gregg PA-C  [address] [facility]  [address] [place], [place] [id number]    Patient: Kajal Tristan   YOB: 1990   Date of Visit: 2024       Dear Dr. Gregg:    Thank you for referring Kajal Tristan to me for evaluation. Below are my notes for this consultation.    If you have questions, please do not hesitate to call me. I look forward to following your patient along with you.         Sincerely,        Mel Kee MD        CC: No Recipients    Mel Kee MD  2024  3:40 PM  Sign when Signing Visit  [facility]: Ms. Tristan was seen today for anatomic survey and cervical length screening ultrasound.  See ultrasound report under \"OB Procedures\" tab.        Physical Exam  Constitutional:       General: She is not in acute distress.     Appearance: Normal appearance.   HENT:      Head: Normocephalic and atraumatic.   Eyes:      Extraocular Movements: Extraocular movements intact.   Cardiovascular:      Rate and Rhythm: Normal rate.   Pulmonary:      Effort: Pulmonary effort is normal. No respiratory distress.   Skin:     Findings: No erythema or rash.   Neurological:      Mental Status: She is alert and oriented to person, place, and time.   Psychiatric:         Mood and Affect: Mood normal.         Behavior: Behavior normal.         Please don't hesitate to contact our office with any concerns or questions.  -Mel Kee MD         "

## 2024-01-19 NOTE — TELEPHONE ENCOUNTER
Patient called and was looking for a lab for blood work.  I made sure the labs were in the chart.  I gave her the address but she asked me to Mychart her a message because a doctor had just walk in.  I am unable to do that.

## 2024-01-20 LAB
GAMMA INTERFERON BACKGROUND BLD IA-ACNC: 0.31 IU/ML
M TB IFN-G BLD-IMP: NEGATIVE
M TB IFN-G CD4+ BCKGRND COR BLD-ACNC: -0.11 IU/ML
M TB IFN-G CD4+ BCKGRND COR BLD-ACNC: -0.12 IU/ML
MITOGEN IGNF BCKGRD COR BLD-ACNC: 1.59 IU/ML

## 2024-01-21 LAB
2ND TRIMESTER 4 SCREEN SERPL-IMP: NORMAL
AFP ADJ MOM SERPL: 0.79
AFP INTERP AMN-IMP: NORMAL
AFP INTERP SERPL-IMP: NORMAL
AFP INTERP SERPL-IMP: NORMAL
AFP SERPL-MCNC: 50.7 NG/ML
AGE AT DELIVERY: 34.1 YR
GA METHOD: NORMAL
GA: 20.9 WEEKS
IDDM PATIENT QL: NO
MULTIPLE PREGNANCY: NO
NEURAL TUBE DEFECT RISK FETUS: NORMAL %

## 2024-02-01 ENCOUNTER — ROUTINE PRENATAL (OUTPATIENT)
Dept: OBGYN CLINIC | Facility: CLINIC | Age: 34
End: 2024-02-01

## 2024-02-01 VITALS
HEART RATE: 95 BPM | HEIGHT: 64 IN | SYSTOLIC BLOOD PRESSURE: 120 MMHG | BODY MASS INDEX: 30.56 KG/M2 | OXYGEN SATURATION: 99 % | WEIGHT: 179 LBS | DIASTOLIC BLOOD PRESSURE: 66 MMHG

## 2024-02-01 DIAGNOSIS — Z3A.22 22 WEEKS GESTATION OF PREGNANCY: ICD-10-CM

## 2024-02-01 DIAGNOSIS — Z34.02 ENCOUNTER FOR SUPERVISION OF LOW-RISK FIRST PREGNANCY IN SECOND TRIMESTER: Primary | ICD-10-CM

## 2024-02-01 DIAGNOSIS — R89.9 ABNORMAL LABORATORY TEST: ICD-10-CM

## 2024-02-01 PROCEDURE — PNV: Performed by: PHYSICIAN ASSISTANT

## 2024-02-01 NOTE — PROGRESS NOTES
22w5d pregnant female, here for prenatal visit. Pt well, without complaints.     MsAFP: negative   Anatomy scan: completed 1/9/24    Current Outpatient Medications on File Prior to Visit   Medication Sig Dispense Refill    Prenatal Vit-Fe Fumarate-FA (PRENATAL VITAMIN AND MINERAL PO) Take by mouth       No current facility-administered medications on file prior to visit.       Pregravid Weight/BMI: 72.6 kg (160 lb) (BMI 27.45)  Current Weight:     Total Weight Gain: 8.618 kg (19 lb)            Vitals:    02/01/24 1350   BP: 120/66   Pulse: 95   SpO2: 99%       Fundal height: 22  Fetal Heart Rate: 156      Problem List          Genitourinary    Fibroid uterus    Overview     Anterior intramural- 3.38 x 2.73 x 2.03 cm  Left lateral subserosal- 4.68 x 3.66 x 2.83 cm            Other    Encounter for supervision of low-risk first pregnancy in second trimester    Overview     Transfer from [facility] at 19 weeks  NIPT : low risk  Carrier screen: negative         22 weeks gestation of pregnancy    Herpes    Overview     Will need to start valtrex @ 36 weeks         Not immune to measles    Overview     Will need postpartum vaccine         Abnormal laboratory test    Overview     Quantiferon TB test: indeterminate.   Repeat ordered              Ob visit in 4 weeks  US scheduled 4/5/24

## 2024-02-01 NOTE — PATIENT INSTRUCTIONS
Pregnancy at 23 to 26 Weeks   AMBULATORY CARE:   What changes are happening to your body:  You are now close to or at the beginning of the third trimester. The third trimester starts at 24 weeks and ends with delivery. As your baby gets larger, you may develop certain symptoms. These may include pain in your back or down the sides of your abdomen. You may also have stretch marks on your abdomen, breasts, thighs, or buttocks. You may also have constipation.  Seek care immediately if:   You develop a severe headache that does not go away.    You have new or increased vision changes, such as blurred or spotted vision.    You have new or increased swelling in your face or hands.    You have vaginal spotting or bleeding.    Your water broke or you feel warm water gushing or trickling from your vagina.    Call your doctor or obstetrician if:   You have abdominal cramps, pressure, or tightening.    You have a change in vaginal discharge.    You have light bleeding.    You have chills or a fever.    You have vaginal itching, burning, or pain.    You have yellow, green, white, or foul-smelling vaginal discharge.    You have pain or burning when you urinate, less urine than usual, or pink or bloody urine.    You have questions or concerns about your condition or care.    How to care for yourself at this stage of your pregnancy:       Eat a variety of healthy foods.  Healthy foods include fruits, vegetables, whole-grain breads, low-fat dairy foods, beans, lean meats, and fish. Drink liquids as directed. Ask how much liquid to drink each day and which liquids are best for you. Limit caffeine to less than 200 milligrams each day. Limit your intake of fish to 2 servings each week. Choose fish low in mercury such as canned light tuna, shrimp, salmon, cod, or tilapia. Do not  eat fish high in mercury such as swordfish, tilefish, rebecca mackerel, and shark.         Manage back pain.  Do not stand for long periods of time or lift heavy  items. Use good posture while you stand, squat, or bend. Wear low-heeled shoes with good support. Rest may also help to relieve back pain. Ask your healthcare provider about exercises you can do to strengthen your back muscles.    Take prenatal vitamins as directed.  Your need for certain vitamins and minerals, such as folic acid, increases during pregnancy. Prenatal vitamins provide some of the extra vitamins and minerals you need. Prenatal vitamins may also help to decrease the risk of certain birth defects.         Talk to your healthcare provider about exercise.  Moderate exercise can help you stay fit. Your healthcare provider will help you plan an exercise program that is safe for you during pregnancy.         Do not smoke.  Smoking increases your risk of a miscarriage and other health problems during your pregnancy. Smoking can cause your baby to be born too early or weigh less at birth. Ask your healthcare provider for information if you need help quitting.    Do not drink alcohol.  Alcohol passes from your body to your baby through the placenta. It can affect your baby's brain development and cause fetal alcohol syndrome (FAS). FAS is a group of conditions that causes mental, behavior, and growth problems.    Talk to your healthcare provider before you take any medicines.  Many medicines may harm your baby if you take them when you are pregnant. Do not take any medicines, vitamins, herbs, or supplements without first talking to your healthcare provider. Never use illegal or street drugs (such as marijuana or cocaine) while you are pregnant.  Safety tips during pregnancy:   Avoid hot tubs and saunas.  Do not use a hot tub or sauna while you are pregnant, especially during your first trimester. Hot tubs and saunas may raise your baby's temperature and increase the risk of birth defects.    Avoid toxoplasmosis.  This is an infection caused by eating raw meat or being around infected cat feces. It can cause  birth defects, miscarriages, and other problems. Wash your hands after you touch raw meat. Make sure any meat is well-cooked before you eat it. Avoid raw eggs and unpasteurized milk. Use gloves or ask someone else to clean your cat's litter box while you are pregnant.       Changes that are happening with your baby:  By 26 weeks, your baby will weigh about 2 pounds. Your baby will be about 10 inches long from the top of the head to the rump (baby's bottom). Your baby's movements are much stronger now. Your baby's eyes are almost completely formed and can partially open. Your baby also sleeps and wakes up.  What you need to know about prenatal care:  Your healthcare provider will check your blood pressure and weight. You may also need the following:  A urine test  may also be done to check for sugar and protein. These can be signs of gestational diabetes or infection. Protein in your urine may also be a sign of preeclampsia. Preeclampsia is a condition that can develop during week 20 or later of your pregnancy. It causes high blood pressure, and it can cause problems with your kidneys and other organs.    A gestational diabetes screen  may be done. Your healthcare provider may order either a 1-step or 2-step oral glucose tolerance test (OGTT).     1-step OGTT:  Your blood sugar level will be tested after you have not eaten for 8 hours (fasting). You will then be given a glucose drink. Your level will be tested again 1 hour and 2 hours after you finish the drink.    2-step OGTT:  You do not have to fast for the first part of the test. You will have the glucose drink at any time of day. Your blood sugar level will be checked 1 hour later. If your blood sugar is higher than a certain level, another test will be ordered. You will fast and your blood sugar level will be tested. You will have the glucose drink. Your blood will be tested again 1 hour, 2 hours, and 3 hours after you finish the glucose drink.    Fundal height   is a measurement of your uterus to check your baby's growth. This number is usually the same as the number of weeks that you have been pregnant.    Your baby's heart rate  will be checked.    Follow up with your doctor or obstetrician as directed:  Write down your questions so you remember to ask them during your visits.  © Copyright [employer] 2023 Information is for End User's use only and may not be sold, redistributed or otherwise used for commercial purposes.  The above information is an  only. It is not intended as medical advice for individual conditions or treatments. Talk to your doctor, nurse or pharmacist before following any medical regimen to see if it is safe and effective for you.

## 2024-02-28 ENCOUNTER — ROUTINE PRENATAL (OUTPATIENT)
Dept: OBGYN CLINIC | Facility: CLINIC | Age: 34
End: 2024-02-28

## 2024-02-28 VITALS
SYSTOLIC BLOOD PRESSURE: 106 MMHG | DIASTOLIC BLOOD PRESSURE: 58 MMHG | BODY MASS INDEX: 31.76 KG/M2 | HEIGHT: 64 IN | WEIGHT: 186 LBS | HEART RATE: 83 BPM

## 2024-02-28 DIAGNOSIS — Z3A.26 26 WEEKS GESTATION OF PREGNANCY: ICD-10-CM

## 2024-02-28 DIAGNOSIS — R89.9 ABNORMAL LABORATORY TEST: ICD-10-CM

## 2024-02-28 DIAGNOSIS — Z34.02 ENCOUNTER FOR SUPERVISION OF LOW-RISK FIRST PREGNANCY IN SECOND TRIMESTER: Primary | ICD-10-CM

## 2024-02-28 PROCEDURE — PNV: Performed by: PHYSICIAN ASSISTANT

## 2024-02-28 NOTE — PROGRESS NOTES
26w4d pregnant female, here for prenatal visit. Pt well, without complaints.       Blood type: O positive    Lab slip given for RPR, CBC , anemia OB reflex & 1 hour GTT to be completed prior to next visit.   Reviewed recommendation for Tdap at 28 weeks gestation.        Current Outpatient Medications:     Prenatal Vit-Fe Fumarate-FA (PRENATAL VITAMIN AND MINERAL PO), Take by mouth, Disp: , Rfl:     Pregravid Weight/BMI: 72.6 kg (160 lb) (BMI 27.45)  Current Weight:     Total Weight Gain: 8.618 kg (19 lb)          Vitals:    02/28/24 1405   BP: 106/58   Pulse: 83       Fundal height: 26  Fetal Heart Rate: 144      Problem List          Genitourinary    Fibroid uterus    Overview     Anterior intramural- 3.38 x 2.73 x 2.03 cm  Left lateral subserosal- 4.68 x 3.66 x 2.83 cm            Other    Encounter for supervision of low-risk first pregnancy in second trimester    Overview     Transfer from [facility] at 19 weeks  NIPT : low risk  Carrier screen: negative         26 weeks gestation of pregnancy    Herpes    Overview     Will need to start valtrex @ 36 weeks         Not immune to measles    Overview     Will need postpartum vaccine         Abnormal laboratory test    Overview     Quantiferon TB test: indeterminate.   Repeat ordered: negative              Ob visit in 4 wks  US scheduled 4/5/24  Lab slip given for RPR, CBC , anemia OB reflex & 1 hour GTT to be completed prior to next visit.

## 2024-03-07 ENCOUNTER — APPOINTMENT (OUTPATIENT)
Dept: LAB | Facility: CLINIC | Age: 34
End: 2024-03-07
Payer: COMMERCIAL

## 2024-03-07 DIAGNOSIS — Z34.02 ENCOUNTER FOR SUPERVISION OF LOW-RISK FIRST PREGNANCY IN SECOND TRIMESTER: ICD-10-CM

## 2024-03-07 DIAGNOSIS — Z3A.26 26 WEEKS GESTATION OF PREGNANCY: ICD-10-CM

## 2024-03-07 LAB
BASOPHILS # BLD AUTO: 0.03 THOUSANDS/ÂΜL (ref 0–0.1)
BASOPHILS NFR BLD AUTO: 0 % (ref 0–1)
EOSINOPHIL # BLD AUTO: 0.07 THOUSAND/ÂΜL (ref 0–0.61)
EOSINOPHIL NFR BLD AUTO: 1 % (ref 0–6)
ERYTHROCYTE [DISTWIDTH] IN BLOOD BY AUTOMATED COUNT: 13.2 % (ref 11.6–15.1)
GLUCOSE 1H P 50 G GLC PO SERPL-MCNC: 96 MG/DL (ref 70–134)
HCT VFR BLD AUTO: 38.1 % (ref 34.8–46.1)
HGB BLD-MCNC: 12.9 G/DL (ref 11.5–15.4)
IMM GRANULOCYTES # BLD AUTO: 0.11 THOUSAND/UL (ref 0–0.2)
IMM GRANULOCYTES NFR BLD AUTO: 2 % (ref 0–2)
LYMPHOCYTES # BLD AUTO: 1.4 THOUSANDS/ÂΜL (ref 0.6–4.47)
LYMPHOCYTES NFR BLD AUTO: 19 % (ref 14–44)
MCH RBC QN AUTO: 30.6 PG (ref 26.8–34.3)
MCHC RBC AUTO-ENTMCNC: 33.9 G/DL (ref 31.4–37.4)
MCV RBC AUTO: 91 FL (ref 82–98)
MONOCYTES # BLD AUTO: 0.45 THOUSAND/ÂΜL (ref 0.17–1.22)
MONOCYTES NFR BLD AUTO: 6 % (ref 4–12)
NEUTROPHILS # BLD AUTO: 5.22 THOUSANDS/ÂΜL (ref 1.85–7.62)
NEUTS SEG NFR BLD AUTO: 72 % (ref 43–75)
NRBC BLD AUTO-RTO: 0 /100 WBCS
PLATELET # BLD AUTO: 180 THOUSANDS/UL (ref 149–390)
PMV BLD AUTO: 11.4 FL (ref 8.9–12.7)
RBC # BLD AUTO: 4.21 MILLION/UL (ref 3.81–5.12)
WBC # BLD AUTO: 7.28 THOUSAND/UL (ref 4.31–10.16)

## 2024-03-07 PROCEDURE — 36415 COLL VENOUS BLD VENIPUNCTURE: CPT

## 2024-03-07 PROCEDURE — 82950 GLUCOSE TEST: CPT

## 2024-03-07 PROCEDURE — 85025 COMPLETE CBC W/AUTO DIFF WBC: CPT

## 2024-03-07 PROCEDURE — 86780 TREPONEMA PALLIDUM: CPT

## 2024-03-08 LAB — TREPONEMA PALLIDUM IGG+IGM AB [PRESENCE] IN SERUM OR PLASMA BY IMMUNOASSAY: NORMAL

## 2024-03-13 ENCOUNTER — ROUTINE PRENATAL (OUTPATIENT)
Dept: OBGYN CLINIC | Facility: CLINIC | Age: 34
End: 2024-03-13

## 2024-03-13 VITALS
BODY MASS INDEX: 32.32 KG/M2 | HEIGHT: 64 IN | WEIGHT: 189.3 LBS | DIASTOLIC BLOOD PRESSURE: 64 MMHG | SYSTOLIC BLOOD PRESSURE: 122 MMHG | HEART RATE: 100 BPM | OXYGEN SATURATION: 98 %

## 2024-03-13 DIAGNOSIS — Z3A.28 28 WEEKS GESTATION OF PREGNANCY: ICD-10-CM

## 2024-03-13 DIAGNOSIS — D25.9 UTERINE LEIOMYOMA, UNSPECIFIED LOCATION: Primary | ICD-10-CM

## 2024-03-13 DIAGNOSIS — Z34.03 ENCOUNTER FOR SUPERVISION OF LOW-RISK FIRST PREGNANCY IN THIRD TRIMESTER: ICD-10-CM

## 2024-03-13 PROCEDURE — PNV: Performed by: OBSTETRICS & GYNECOLOGY

## 2024-03-13 NOTE — ASSESSMENT & PLAN NOTE
Reviewed benefits of tdap - declines  Given consents to review  Growth US scheduled  OB precautions

## 2024-03-13 NOTE — PROGRESS NOTES
"    S: 33 y.o.  28w4d here for routine prenatal visit.        Chief Complaint   Patient presents with    Routine Prenatal Visit     No concerns          OB complaints:  Contractions: no  Leakage: no  Bleeding: no  Fetal movement: yes        O:  /64   Pulse 100   Ht 5' 4\" (1.626 m)   Wt 85.9 kg (189 lb 4.8 oz)   LMP 2023 (Exact Date)   SpO2 98%   BMI 32.49 kg/m²       Review of Systems   Constitutional:  Negative for chills and fever.   Eyes:  Negative for visual disturbance.   Respiratory:  Negative for chest tightness and shortness of breath.    Cardiovascular:  Negative for chest pain.   Gastrointestinal:  Negative for abdominal pain, diarrhea, nausea and vomiting.   Genitourinary:  Negative for pelvic pain and vaginal bleeding.        As noted in HPI   Skin:  Negative for rash.   Neurological:  Negative for headaches.   All other systems reviewed and are negative.        Physical Exam  Constitutional:       General: She is not in acute distress.     Appearance: Normal appearance.   HENT:      Head: Normocephalic and atraumatic.   Cardiovascular:      Rate and Rhythm: Normal rate.   Pulmonary:      Effort: Pulmonary effort is normal. No respiratory distress.   Abdominal:      General: There is no distension.      Palpations: Abdomen is soft.      Tenderness: There is no abdominal tenderness. There is no guarding or rebound.      Comments: gravid   Neurological:      General: No focal deficit present.      Mental Status: She is alert.   Psychiatric:         Mood and Affect: Mood normal.         Behavior: Behavior normal.   Vitals and nursing note reviewed.           Fundal Height (cm): 28 cm  Fetal Heart Rate: 145    Pregravid Weight/BMI: 72.6 kg (160 lb) (BMI 27.45)  Current Weight: 85.9 kg (189 lb 4.8 oz)   Total Weight Gain: 13.3 kg (29 lb 4.8 oz)     Pre-Josh Vitals      Flowsheet Row Most Recent Value   Prenatal Assessment    Fetal Heart Rate 145   Fundal Height (cm) 28 cm   Movement " Present   Prenatal Vitals    Blood Pressure 122/64   Weight - Scale 85.9 kg (189 lb 4.8 oz)   Urine Albumin/Glucose    Dilation/Effacement/Station    Vaginal Drainage    Edema               Results for orders placed or performed in visit on 03/07/24   CBC and differential   Result Value Ref Range    WBC 7.28 4.31 - 10.16 Thousand/uL    RBC 4.21 3.81 - 5.12 Million/uL    Hemoglobin 12.9 11.5 - 15.4 g/dL    Hematocrit 38.1 34.8 - 46.1 %    MCV 91 82 - 98 fL    MCH 30.6 26.8 - 34.3 pg    MCHC 33.9 31.4 - 37.4 g/dL    RDW 13.2 11.6 - 15.1 %    MPV 11.4 8.9 - 12.7 fL    Platelets 180 149 - 390 Thousands/uL    nRBC 0 /100 WBCs    Neutrophils Relative 72 43 - 75 %    Immature Grans % 2 0 - 2 %    Lymphocytes Relative 19 14 - 44 %    Monocytes Relative 6 4 - 12 %    Eosinophils Relative 1 0 - 6 %    Basophils Relative 0 0 - 1 %    Neutrophils Absolute 5.22 1.85 - 7.62 Thousands/µL    Absolute Immature Grans 0.11 0.00 - 0.20 Thousand/uL    Absolute Lymphocytes 1.40 0.60 - 4.47 Thousands/µL    Absolute Monocytes 0.45 0.17 - 1.22 Thousand/µL    Eosinophils Absolute 0.07 0.00 - 0.61 Thousand/µL    Basophils Absolute 0.03 0.00 - 0.10 Thousands/µL   Glucose, 1H PG   Result Value Ref Range    Glucose 96 70 - 134 mg/dL   RPR-Syphilis Screening (Total Syphilis IGG/IGM)   Result Value Ref Range    Syphilis Total Antibody Non-reactive Non-Reactive         Problem List       Encounter for supervision of low-risk first pregnancy in third trimester    Overview     Transfer from [facility] at 19 weeks  NIPT : low risk  Carrier screen: negative  Declines tdap          Current Assessment & Plan     Reviewed benefits of tdap - declines  Given consents to review  Growth US scheduled  OB precautions         28 weeks gestation of pregnancy    Herpes    Overview     Will need to start valtrex @ 36 weeks         Fibroid uterus    Overview     Anterior intramural- 3.38 x 2.73 x 2.03 cm  Left lateral subserosal- 4.68 x 3.66 x 2.83 cm         Not  immune to measles    Overview     Will need postpartum vaccine                              Mikayla Reeder MD  3/13/2024  3:34 PM

## 2024-03-18 ENCOUNTER — TELEPHONE (OUTPATIENT)
Age: 34
End: 2024-03-18

## 2024-03-18 NOTE — TELEPHONE ENCOUNTER
Pt called stating she was seen on the 13th of this month and was expecting to receive a call to be sched for another OB visit in 3 weeks as they have been sched every 3 weeks. She has not received a phone call to sched and wanted to know is that because she is sched to come in on the 5th of April to have an ultrasound will she be sched after that appt to see her OB. She would like a call back to further discuss as she would be due to be seen again on 4/3/24 by her OB.

## 2024-04-04 PROBLEM — D25.9 UTERINE FIBROID DURING PREGNANCY, ANTEPARTUM: Status: ACTIVE | Noted: 2024-04-04

## 2024-04-04 PROBLEM — O34.10 UTERINE FIBROID DURING PREGNANCY, ANTEPARTUM: Status: ACTIVE | Noted: 2024-04-04

## 2024-04-04 PROBLEM — Z3A.31 31 WEEKS GESTATION OF PREGNANCY: Status: ACTIVE | Noted: 2024-01-12

## 2024-04-04 NOTE — PROGRESS NOTES
Patient here for prenatal visit.  She is 31w6d pregnant.     Patient still looking over consent form.  Will bring to next visit.      US today: EFW 52%.  Her fibroids remain stable compared to her prior ultrasound.     Fetal movement: yes  Bleeding: no  Contractions: occasional  Edema: no      Tdap : declines    Infant feeding intent: breast  Contraception plans: ?    Current Outpatient Medications on File Prior to Visit   Medication Sig Dispense Refill    Prenatal Vit-Fe Fumarate-FA (PRENATAL VITAMIN AND MINERAL PO) Take by mouth       No current facility-administered medications on file prior to visit.       Pregravid Weight/BMI: 72.6 kg (160 lb) (BMI 27.45)  Current Weight:     Total Weight Gain: 13.3 kg (29 lb 4.8 oz)          Vitals:    24 0952   BP: 118/70   Pulse: 100   SpO2: 99%         Fundal height: 32  Fetal Heart Rate: 150        Physical Exam  Constitutional:       General: She is not in acute distress.     Appearance: She is well-developed.   Abdominal:      Palpations: Abdomen is soft.      Tenderness: There is no abdominal tenderness. There is no guarding.   Neurological:      Mental Status: She is alert and oriented to person, place, and time.   Skin:     General: Skin is warm and dry.   Psychiatric:         Behavior: Behavior normal.        Problem List          Genitourinary    Fibroid uterus    Overview     Anterior intramural- 3.38 x 2.73 x 2.03 cm  Left lateral subserosal- 4.68 x 3.66 x 2.83 cm            Obstetrics/Gynecology    Encounter for supervision of low-risk first pregnancy in third trimester    Overview     Transfer from [facility] at 19 weeks  NIPT : low risk  Carrier screen: negative  Declines tdap          31 weeks gestation of pregnancy       Other    Herpes    Overview     Will need to start valtrex @ 36 weeks         Not immune to measles    Overview     Will need postpartum vaccine              Ob visit in  2 wks  FKC &  labor precautions reviewed

## 2024-04-05 ENCOUNTER — ROUTINE PRENATAL (OUTPATIENT)
Dept: OBGYN CLINIC | Facility: CLINIC | Age: 34
End: 2024-04-05

## 2024-04-05 ENCOUNTER — ULTRASOUND (OUTPATIENT)
Dept: PERINATAL CARE | Facility: OTHER | Age: 34
End: 2024-04-05
Payer: COMMERCIAL

## 2024-04-05 VITALS
HEART RATE: 100 BPM | OXYGEN SATURATION: 99 % | BODY MASS INDEX: 32.27 KG/M2 | WEIGHT: 189 LBS | DIASTOLIC BLOOD PRESSURE: 70 MMHG | SYSTOLIC BLOOD PRESSURE: 118 MMHG | HEIGHT: 64 IN

## 2024-04-05 VITALS
HEART RATE: 92 BPM | BODY MASS INDEX: 33.16 KG/M2 | WEIGHT: 194.2 LBS | SYSTOLIC BLOOD PRESSURE: 102 MMHG | DIASTOLIC BLOOD PRESSURE: 68 MMHG | HEIGHT: 64 IN

## 2024-04-05 DIAGNOSIS — Z36.89 ENCOUNTER FOR ULTRASOUND TO ASSESS FETAL GROWTH: ICD-10-CM

## 2024-04-05 DIAGNOSIS — Z3A.31 31 WEEKS GESTATION OF PREGNANCY: ICD-10-CM

## 2024-04-05 DIAGNOSIS — D25.9 UTERINE FIBROID DURING PREGNANCY, ANTEPARTUM: Primary | ICD-10-CM

## 2024-04-05 DIAGNOSIS — O34.10 UTERINE FIBROID DURING PREGNANCY, ANTEPARTUM: Primary | ICD-10-CM

## 2024-04-05 DIAGNOSIS — Z36.2 ENCOUNTER FOR FOLLOW-UP ULTRASOUND OF FETAL ANATOMY: ICD-10-CM

## 2024-04-05 DIAGNOSIS — Z34.03 ENCOUNTER FOR SUPERVISION OF LOW-RISK FIRST PREGNANCY IN THIRD TRIMESTER: Primary | ICD-10-CM

## 2024-04-05 LAB
DME PARACHUTE DELIVERY DATE REQUESTED: NORMAL
DME PARACHUTE ITEM DESCRIPTION: NORMAL
DME PARACHUTE ORDER STATUS: NORMAL
DME PARACHUTE SUPPLIER NAME: NORMAL
DME PARACHUTE SUPPLIER PHONE: NORMAL

## 2024-04-05 PROCEDURE — PNV: Performed by: PHYSICIAN ASSISTANT

## 2024-04-05 PROCEDURE — 76816 OB US FOLLOW-UP PER FETUS: CPT | Performed by: STUDENT IN AN ORGANIZED HEALTH CARE EDUCATION/TRAINING PROGRAM

## 2024-04-05 NOTE — PROGRESS NOTES
"[facility]: Ms. Tristan was seen today for fetal growth and followup missed anatomy ultrasound.  See ultrasound report under \"OB Procedures\" tab.   Please don't hesitate to contact our office with any concerns or questions.  -Mel Kee MD    "

## 2024-04-19 ENCOUNTER — NURSE TRIAGE (OUTPATIENT)
Age: 34
End: 2024-04-19

## 2024-04-19 ENCOUNTER — TELEPHONE (OUTPATIENT)
Age: 34
End: 2024-04-19

## 2024-04-19 NOTE — TELEPHONE ENCOUNTER
Regarding: Possible [place]  Conractions  ----- Message from Carmen Spangler sent at 4/19/2024  8:54 AM EDT -----  Pt  called 34 weeks and states she may  be having palma moore contractions  but not  sure  and would like to speak to a nurse

## 2024-04-19 NOTE — TELEPHONE ENCOUNTER
"Reason for Disposition  • MILD abdominal pain (e.g., doesn't interfere with normal activities)    Answer Assessment - Initial Assessment Questions  Kajal reported abdominal tightening/releasing last evening last several minutes at a time x 1/2 hour.  Has not felt that type of pain in past.  Symptoms resolved spontaneously and did not return through night. No pain this morning.  Reports +FM, denies vaginal bleeding or LOF.  Reviewed BH contractions. Contact office if more than 6 in one hour or c/b with any additional concerns or questions.      Reviewed location for [facility].  Provided step by step instructions to [facility] link to schedule tour. Aware virtual tour is also available.       1. LOCATION: \"Where does it hurt?\"       abdomen  2. RADIATION: \"Does the pain shoot anywhere else?\" (e.g., chest, back)      Around stomach into pelvis  3. ONSET: \"When did the pain begin?\" (Minutes, hours or days ago)       Pain last evening-resolved spontaneously with no pain through night or this morning  4. ONSET: \"Gradual or sudden onset?\"      sudden  5. PATTERN: \"Does the pain come and go, or has it been constant since it started?\"       Intermittent abdominal tightening lasting several seconds x 1/2 hour  6. SEVERITY: \"How bad is the pain?\" \"What does it keep you from doing?\"  (e.g., Scale 1-10; mild, moderate, or severe)    - MILD (1-3): doesn't interfere with normal activities, abdomen soft and not tender to touch     - MODERATE (4-7): interferes with normal activities or awakens from sleep, tender to touch     - SEVERE (8-10): excruciating pain, doubled over, unable to do any normal activities      moderate  7. RECURRENT SYMPTOM: \"Have you ever had this type of stomach pain before?\" If Yes, ask: \"When was the last time?\" and \"What happened that time?\"       denies  8. CAUSE: \"What do you think is causing the stomach pain?      Unsure if [place] or labor contractions  9. " "RELIEVING/AGGRAVATING FACTORS: \"What makes it better or worse?\" (e.g., antacids, bowel movement, movement)      Resolved on it's own  10. FETAL MOVEMENT: \"Has the baby's movement decreased or changed significantly from normal?\"        Normal FM  11. OTHER SYMPTOMS: \"Has there been any vaginal bleeding, fever, vomiting, diarrhea, or urine problems?\"        denies  12. LOREE: \"What date are you expecting to deliver?\"        06/01/2024    Protocols used: Pregnancy - Abdominal Pain Greater Than 20 Weeks EGA-ADULT-[place]    "

## 2024-04-19 NOTE — TELEPHONE ENCOUNTER
Received phone call from patient inquiring which location to give birth at when due in June.  Patient also mentioned slight spotting previous evening.  Warm transferred call to office and was informed that provider was at different location.  When transferring to other office call was disconnected.

## 2024-04-22 ENCOUNTER — ROUTINE PRENATAL (OUTPATIENT)
Dept: OBGYN CLINIC | Facility: CLINIC | Age: 34
End: 2024-04-22

## 2024-04-22 VITALS
HEART RATE: 88 BPM | BODY MASS INDEX: 33.8 KG/M2 | HEIGHT: 64 IN | SYSTOLIC BLOOD PRESSURE: 118 MMHG | DIASTOLIC BLOOD PRESSURE: 72 MMHG | WEIGHT: 198 LBS | OXYGEN SATURATION: 99 %

## 2024-04-22 DIAGNOSIS — Z34.03 ENCOUNTER FOR SUPERVISION OF LOW-RISK FIRST PREGNANCY IN THIRD TRIMESTER: ICD-10-CM

## 2024-04-22 DIAGNOSIS — O34.10 UTERINE FIBROID DURING PREGNANCY, ANTEPARTUM: Primary | ICD-10-CM

## 2024-04-22 DIAGNOSIS — A60.00 GENITAL HERPES SIMPLEX, UNSPECIFIED SITE: ICD-10-CM

## 2024-04-22 DIAGNOSIS — D25.9 UTERINE FIBROID DURING PREGNANCY, ANTEPARTUM: Primary | ICD-10-CM

## 2024-04-22 DIAGNOSIS — Z3A.34 34 WEEKS GESTATION OF PREGNANCY: ICD-10-CM

## 2024-04-22 LAB
DME PARACHUTE DELIVERY DATE ACTUAL: NORMAL
DME PARACHUTE DELIVERY DATE REQUESTED: NORMAL
DME PARACHUTE ITEM DESCRIPTION: NORMAL
DME PARACHUTE ORDER STATUS: NORMAL
DME PARACHUTE SUPPLIER NAME: NORMAL
DME PARACHUTE SUPPLIER PHONE: NORMAL

## 2024-04-22 PROCEDURE — PNV: Performed by: OBSTETRICS & GYNECOLOGY

## 2024-04-22 RX ORDER — VALACYCLOVIR HYDROCHLORIDE 500 MG/1
500 TABLET, FILM COATED ORAL 2 TIMES DAILY
Qty: 90 TABLET | Refills: 0 | Status: SHIPPED | OUTPATIENT
Start: 2024-04-22 | End: 2024-06-06

## 2024-04-22 NOTE — ASSESSMENT & PLAN NOTE
Discussed delivery consent - she is unsure about accepting blood products. Partner is a [employer] although she is not. States her concern is potential risk of transmissible disease. Given blood refusal form to review, can discuss more next visit.   OB precautions reviewed

## 2024-04-22 NOTE — PROGRESS NOTES
"    S: 34 y.o.  34w2d here for routine prenatal visit.        Chief Complaint   Patient presents with    Routine Prenatal Visit     Having some palma benjamin          OB complaints:  Contractions: a few days ago had some more painful contractions but they resolved spontaneously   Leakage: no  Bleeding: no  Fetal movement: yes      O:  /72   Pulse 88   Ht 5' 4\" (1.626 m)   Wt 89.8 kg (198 lb)   LMP 2023 (Exact Date)   SpO2 99%   BMI 33.99 kg/m²       Review of Systems   Constitutional:  Negative for chills and fever.   Eyes:  Negative for visual disturbance.   Respiratory:  Negative for chest tightness and shortness of breath.    Cardiovascular:  Negative for chest pain.   Gastrointestinal:  Negative for abdominal pain, diarrhea, nausea and vomiting.   Genitourinary:  Negative for pelvic pain and vaginal bleeding.        As noted in HPI   Skin:  Negative for rash.   Neurological:  Negative for headaches.   All other systems reviewed and are negative.        Physical Exam  Constitutional:       General: She is not in acute distress.     Appearance: Normal appearance.   HENT:      Head: Normocephalic and atraumatic.   Cardiovascular:      Rate and Rhythm: Normal rate.   Pulmonary:      Effort: Pulmonary effort is normal. No respiratory distress.   Abdominal:      General: There is no distension.      Palpations: Abdomen is soft.      Tenderness: There is no abdominal tenderness. There is no guarding or rebound.      Comments: gravid   Neurological:      General: No focal deficit present.      Mental Status: She is alert.   Psychiatric:         Mood and Affect: Mood normal.         Behavior: Behavior normal.   Vitals and nursing note reviewed.           Fundal Height (cm): 35 cm  Fetal Heart Rate: 140    Pregravid Weight/BMI: 72.6 kg (160 lb) (BMI 27.45)  Current Weight: 89.8 kg (198 lb)   Total Weight Gain: 17.2 kg (38 lb)     Pre- Vitals      Flowsheet Row Most Recent Value   Prenatal " Assessment    Fetal Heart Rate 140   Fundal Height (cm) 35 cm   Movement Present   Prenatal Vitals    Blood Pressure 118/72   Weight - Scale 89.8 kg (198 lb)   Urine Albumin/Glucose    Dilation/Effacement/Station    Vaginal Drainage    Edema               Results for orders placed or performed in visit on 04/05/24   Home Breast Pump   Result Value Ref Range    Supplier Name [employer]     Supplier Phone Number [phone]     Order Status Completed     Delivery Note      Delivery Request Date 04/05/2024     Date Delivered  04/05/2024     Item Description Home breast pump          Problem List       Encounter for supervision of low-risk first pregnancy in third trimester    Overview     Transfer from [place] at 19 weeks  NIPT : low risk  Carrier screen: negative  Declines tdap          Current Assessment & Plan     Discussed delivery consent - she is unsure about accepting blood products. Partner is a [employer] although she is not. States her concern is potential risk of transmissible disease. Given blood refusal form to review, can discuss more next visit.   OB precautions reviewed         34 weeks gestation of pregnancy    Genital herpes    Overview     Will need to start valtrex @ 36 weeks         Current Assessment & Plan     Rx for valtrex sent, discussed starting twice daily  at 36 weeks          Uterine fibroid during pregnancy, antepartum    Overview     Anterior intramural- 3.38 x 2.73 x 2.03 cm  Left lateral subserosal- 4.68 x 3.66 x 2.83 cm         Not immune to measles    Overview     Will need postpartum vaccine                              Mikayla Reeder MD  4/22/2024  4:30 PM

## 2024-04-26 ENCOUNTER — CLINICAL SUPPORT (OUTPATIENT)
Age: 34
End: 2024-04-26

## 2024-04-26 DIAGNOSIS — Z32.2 ENCOUNTER FOR CHILDBIRTH INSTRUCTION: Primary | ICD-10-CM

## 2024-05-01 ENCOUNTER — CLINICAL SUPPORT (OUTPATIENT)
Dept: POSTPARTUM | Facility: CLINIC | Age: 34
End: 2024-05-01

## 2024-05-01 DIAGNOSIS — Z32.2 ENCOUNTER FOR CHILDBIRTH INSTRUCTION: Primary | ICD-10-CM

## 2024-05-06 ENCOUNTER — ROUTINE PRENATAL (OUTPATIENT)
Dept: OBGYN CLINIC | Facility: CLINIC | Age: 34
End: 2024-05-06

## 2024-05-06 VITALS — BODY MASS INDEX: 34.54 KG/M2 | SYSTOLIC BLOOD PRESSURE: 118 MMHG | WEIGHT: 201.2 LBS | DIASTOLIC BLOOD PRESSURE: 72 MMHG

## 2024-05-06 DIAGNOSIS — A60.00 GENITAL HERPES SIMPLEX, UNSPECIFIED SITE: ICD-10-CM

## 2024-05-06 DIAGNOSIS — Z3A.36 36 WEEKS GESTATION OF PREGNANCY: ICD-10-CM

## 2024-05-06 DIAGNOSIS — D25.9 UTERINE FIBROID DURING PREGNANCY, ANTEPARTUM: Primary | ICD-10-CM

## 2024-05-06 DIAGNOSIS — Z34.03 ENCOUNTER FOR SUPERVISION OF LOW-RISK FIRST PREGNANCY IN THIRD TRIMESTER: ICD-10-CM

## 2024-05-06 DIAGNOSIS — O34.10 UTERINE FIBROID DURING PREGNANCY, ANTEPARTUM: Primary | ICD-10-CM

## 2024-05-06 PROBLEM — Z34.90 PREGNANCY: Status: ACTIVE | Noted: 2024-01-12

## 2024-05-06 PROBLEM — Z34.90 PREGNANCY: Status: RESOLVED | Noted: 2024-01-12 | Resolved: 2024-05-06

## 2024-05-06 PROCEDURE — PNV: Performed by: OBSTETRICS & GYNECOLOGY

## 2024-05-06 PROCEDURE — 87150 DNA/RNA AMPLIFIED PROBE: CPT | Performed by: OBSTETRICS & GYNECOLOGY

## 2024-05-06 NOTE — ASSESSMENT & PLAN NOTE
After further clarification she states that she IS amenable to blood product transfusion in a life threatening situation. Delivery consent updated   She states she declines operative vaginal delivery  GBS collected, SVE as above

## 2024-05-06 NOTE — PROGRESS NOTES
S: 34 y.o.  36w2d here for routine prenatal visit.        Chief Complaint   Patient presents with    Routine Prenatal Visit         OB complaints:  Contractions: some this morning but overall irregular   Leakage: no  Bleeding: no  Fetal movement: yes      O:  /72   Wt 91.3 kg (201 lb 3.2 oz)   LMP 2023 (Exact Date)   BMI 34.54 kg/m²       Review of Systems   Constitutional:  Negative for chills and fever.   Eyes:  Negative for visual disturbance.   Respiratory:  Negative for chest tightness and shortness of breath.    Cardiovascular:  Negative for chest pain.   Gastrointestinal:  Negative for abdominal pain, diarrhea, nausea and vomiting.   Genitourinary:  Negative for pelvic pain and vaginal bleeding.        As noted in HPI   Skin:  Negative for rash.   Neurological:  Negative for headaches.   All other systems reviewed and are negative.        Physical Exam  Constitutional:       General: She is not in acute distress.     Appearance: Normal appearance.   Genitourinary:      Right Labia: No rash, tenderness, lesions or skin changes.     Left Labia: No tenderness, lesions, skin changes or rash.  HENT:      Head: Normocephalic and atraumatic.   Cardiovascular:      Rate and Rhythm: Normal rate.   Pulmonary:      Effort: Pulmonary effort is normal. No respiratory distress.   Abdominal:      General: There is no distension.      Palpations: Abdomen is soft.      Tenderness: There is no abdominal tenderness. There is no guarding or rebound.      Comments: gravid   Neurological:      General: No focal deficit present.      Mental Status: She is alert.   Psychiatric:         Mood and Affect: Mood normal.         Behavior: Behavior normal.   Vitals and nursing note reviewed. Exam conducted with a chaperone present.           Fundal Height (cm): 37 cm  Fetal Heart Rate: 130    Pregravid Weight/BMI: 72.6 kg (160 lb) (BMI 27.45)  Current Weight: 91.3 kg (201 lb 3.2 oz)   Total Weight Gain: 18.7 kg (41  lb 3.2 oz)     Pre-Josh Vitals      Flowsheet Row Most Recent Value   Prenatal Assessment    Fetal Heart Rate 130   Fundal Height (cm) 37 cm   Movement Present   Prenatal Vitals    Blood Pressure 118/72   Weight - Scale 91.3 kg (201 lb 3.2 oz)   Urine Albumin/Glucose    Dilation/Effacement/Station    Cervical Dilation 0   Cervical Effacement 20   Fetal Station -3   Vaginal Drainage    Edema                     Problem List       Encounter for supervision of low-risk first pregnancy in third trimester    Overview     Transfer from [place] at 19 weeks  NIPT : low risk  Carrier screen: negative  Declines tdap   Delivery consent signed          Current Assessment & Plan     After further clarification she states that she IS amenable to blood product transfusion in a life threatening situation. Delivery consent updated   She states she declines operative vaginal delivery  GBS collected, SVE as above         Genital herpes    Overview     Will need to start valtrex @ 36 weeks         Uterine fibroid during pregnancy, antepartum    Overview     Anterior intramural- 3.38 x 2.73 x 2.03 cm  Left lateral subserosal- 4.68 x 3.66 x 2.83 cm         Not immune to measles    Overview     Will need postpartum vaccine         36 weeks gestation of pregnancy                         Mikayla Reeder MD  2024  4:44 PM

## 2024-05-08 ENCOUNTER — CLINICAL SUPPORT (OUTPATIENT)
Dept: POSTPARTUM | Facility: CLINIC | Age: 34
End: 2024-05-08

## 2024-05-08 DIAGNOSIS — Z32.2 ENCOUNTER FOR CHILDBIRTH INSTRUCTION: Primary | ICD-10-CM

## 2024-05-08 PROBLEM — O99.820 GROUP B STREPTOCOCCAL CARRIAGE COMPLICATING PREGNANCY: Status: ACTIVE | Noted: 2024-05-08

## 2024-05-08 LAB — GP B STREP DNA SPEC QL NAA+PROBE: POSITIVE

## 2024-05-09 ENCOUNTER — TELEPHONE (OUTPATIENT)
Age: 34
End: 2024-05-09

## 2024-05-09 NOTE — TELEPHONE ENCOUNTER
Incoming call from patient regarding positive GBS result reviewed by provider. Reviewed note from provider and patient had multiple questions regarding GBS presence in vagina vs rectum and how it can affect baby before labor and if it can affect amniotic fluid. Patient aware antibiotics only needed during labor. Advised will reach out to provider for further clarification. Patient verbalized understanding. No further questions at this time.       Ro Rdz,     Your group B streptococcus (GBS) swab was positive. This indicates the presence of GBS in the vagina. This is not a sexually transmitted infection and does not require follow up outside of pregnancy. For pregnancy we recommend treatment with antibiotic medication during labor. No medication is needed before or afterwards.     If you have additional questions or concerns, please feel free to contact me.     Mikayla Reeder  Written by Mikayla Reeder MD on 5/8/2024  4:19 PM EDT

## 2024-05-14 ENCOUNTER — ROUTINE PRENATAL (OUTPATIENT)
Dept: OBGYN CLINIC | Facility: CLINIC | Age: 34
End: 2024-05-14

## 2024-05-14 VITALS
OXYGEN SATURATION: 98 % | BODY MASS INDEX: 34.35 KG/M2 | HEIGHT: 64 IN | WEIGHT: 201.2 LBS | HEART RATE: 92 BPM | SYSTOLIC BLOOD PRESSURE: 120 MMHG | DIASTOLIC BLOOD PRESSURE: 70 MMHG

## 2024-05-14 DIAGNOSIS — A60.00 GENITAL HERPES SIMPLEX, UNSPECIFIED SITE: ICD-10-CM

## 2024-05-14 DIAGNOSIS — O99.820 GROUP B STREPTOCOCCAL CARRIAGE COMPLICATING PREGNANCY: ICD-10-CM

## 2024-05-14 DIAGNOSIS — Z34.03 ENCOUNTER FOR SUPERVISION OF LOW-RISK FIRST PREGNANCY IN THIRD TRIMESTER: Primary | ICD-10-CM

## 2024-05-14 DIAGNOSIS — Z3A.37 37 WEEKS GESTATION OF PREGNANCY: ICD-10-CM

## 2024-05-14 DIAGNOSIS — O34.10 UTERINE FIBROID DURING PREGNANCY, ANTEPARTUM: ICD-10-CM

## 2024-05-14 DIAGNOSIS — D25.9 UTERINE FIBROID DURING PREGNANCY, ANTEPARTUM: ICD-10-CM

## 2024-05-14 PROCEDURE — PNV: Performed by: OBSTETRICS & GYNECOLOGY

## 2024-05-14 NOTE — PROGRESS NOTES
"    S: 34 y.o.  37w3d here for routine prenatal visit.        Chief Complaint   Patient presents with    Routine Prenatal Visit     No concerns.          OB complaints:  Contractions: few irregular not painful   Leakage: no  Bleeding: no  Fetal movement: yes      O:  /70 (BP Location: Right arm, Patient Position: Sitting, Cuff Size: Standard)   Pulse 92   Ht 5' 4\" (1.626 m)   Wt 91.3 kg (201 lb 3.2 oz)   LMP 2023 (Exact Date)   SpO2 98%   BMI 34.54 kg/m²       Review of Systems   Constitutional:  Negative for chills and fever.   Eyes:  Negative for visual disturbance.   Respiratory:  Negative for chest tightness and shortness of breath.    Cardiovascular:  Negative for chest pain.   Gastrointestinal:  Negative for abdominal pain, diarrhea, nausea and vomiting.   Genitourinary:  Negative for pelvic pain and vaginal bleeding.        As noted in HPI   Skin:  Negative for rash.   Neurological:  Negative for headaches.   All other systems reviewed and are negative.        Physical Exam  Constitutional:       General: She is not in acute distress.     Appearance: Normal appearance.   HENT:      Head: Normocephalic and atraumatic.   Cardiovascular:      Rate and Rhythm: Normal rate.   Pulmonary:      Effort: Pulmonary effort is normal. No respiratory distress.   Abdominal:      General: There is no distension.      Palpations: Abdomen is soft.      Tenderness: There is no abdominal tenderness. There is no guarding or rebound.      Comments: gravid   Neurological:      General: No focal deficit present.      Mental Status: She is alert.   Psychiatric:         Mood and Affect: Mood normal.         Behavior: Behavior normal.   Vitals and nursing note reviewed.              Fetal Heart Rate: 140    Pregravid Weight/BMI: 72.6 kg (160 lb) (BMI 27.45)  Current Weight: 91.3 kg (201 lb 3.2 oz)   Total Weight Gain: 18.7 kg (41 lb 3.2 oz)     Pre-Josh Vitals      Flowsheet Row Most Recent Value   Prenatal " Assessment    Fetal Heart Rate 140   Movement Present   Prenatal Vitals    Blood Pressure 120/70   Weight - Scale 91.3 kg (201 lb 3.2 oz)   Urine Albumin/Glucose    Dilation/Effacement/Station    Vaginal Drainage    Edema               Results for orders placed or performed in visit on 05/06/24   Strep B DNA probe, amplification    Specimen: Vaginal/Rectal; Genital   Result Value Ref Range    Strep Grp B PCR Positive (A) Negative         Problem List       Encounter for supervision of low-risk first pregnancy in third trimester    Overview     Transfer from [place] at 19 weeks  NIPT : low risk  Carrier screen: negative  Declines tdap   Delivery consent signed          Current Assessment & Plan     SVE deferred due to lack of complaints and pt preference  OB precautions reviewed         Genital herpes    Overview     Will need to start valtrex @ 36 weeks         Uterine fibroid during pregnancy, antepartum    Overview      Intramural myometrium fibroid located in the right anterior corpus  region, measuring 3.3 x 2.5 x 3.7cm, Subserosal myometrium fibroid located in the right anterior corpus  region, measuring 1.6 x 1.0 x 1.7cm, Subserosal myometrium fibroid located in the left lateral corpus  region, measuring 2.5 x 1.3 x 2.1cm   Stable on growth US. EFW at 31 weeks 52nd%         Not immune to measles    Overview     Will need postpartum vaccine         37 weeks gestation of pregnancy    Group B streptococcal carriage complicating pregnancy    Current Assessment & Plan     Reviewed dx and intrapartum tx                               Mikayla Reeder MD  5/14/2024  4:20 PM

## 2024-05-15 ENCOUNTER — CLINICAL SUPPORT (OUTPATIENT)
Dept: POSTPARTUM | Facility: CLINIC | Age: 34
End: 2024-05-15

## 2024-05-15 DIAGNOSIS — Z32.2 ENCOUNTER FOR CHILDBIRTH INSTRUCTION: Primary | ICD-10-CM

## 2024-05-22 ENCOUNTER — ROUTINE PRENATAL (OUTPATIENT)
Dept: OBGYN CLINIC | Facility: CLINIC | Age: 34
End: 2024-05-22

## 2024-05-22 VITALS
SYSTOLIC BLOOD PRESSURE: 122 MMHG | WEIGHT: 204 LBS | OXYGEN SATURATION: 98 % | HEART RATE: 94 BPM | HEIGHT: 64 IN | BODY MASS INDEX: 34.83 KG/M2 | DIASTOLIC BLOOD PRESSURE: 72 MMHG

## 2024-05-22 DIAGNOSIS — Z34.03 ENCOUNTER FOR SUPERVISION OF LOW-RISK FIRST PREGNANCY IN THIRD TRIMESTER: ICD-10-CM

## 2024-05-22 DIAGNOSIS — Z3A.38 38 WEEKS GESTATION OF PREGNANCY: ICD-10-CM

## 2024-05-22 DIAGNOSIS — A60.00 GENITAL HERPES SIMPLEX, UNSPECIFIED SITE: ICD-10-CM

## 2024-05-22 DIAGNOSIS — D25.9 UTERINE FIBROID DURING PREGNANCY, ANTEPARTUM: ICD-10-CM

## 2024-05-22 DIAGNOSIS — O99.820 GROUP B STREPTOCOCCAL CARRIAGE COMPLICATING PREGNANCY: Primary | ICD-10-CM

## 2024-05-22 DIAGNOSIS — O34.10 UTERINE FIBROID DURING PREGNANCY, ANTEPARTUM: ICD-10-CM

## 2024-05-22 PROCEDURE — PNV: Performed by: OBSTETRICS & GYNECOLOGY

## 2024-05-22 NOTE — PROGRESS NOTES
"    S: 34 y.o.  38w4d here for routine prenatal visit.        Chief Complaint   Patient presents with    Routine Prenatal Visit         OB complaints:  Contractions: few irregular   Leakage: no  Bleeding: no  Fetal movement: yes      O:  /72   Pulse 94   Ht 5' 4\" (1.626 m)   Wt 92.5 kg (204 lb)   LMP 2023 (Exact Date)   SpO2 98%   BMI 35.02 kg/m²       Review of Systems   Constitutional:  Negative for chills and fever.   Eyes:  Negative for visual disturbance.   Respiratory:  Negative for chest tightness and shortness of breath.    Cardiovascular:  Negative for chest pain.   Gastrointestinal:  Negative for abdominal pain, diarrhea, nausea and vomiting.   Genitourinary:  Negative for pelvic pain and vaginal bleeding.        As noted in HPI   Skin:  Negative for rash.   Neurological:  Negative for headaches.   All other systems reviewed and are negative.        Physical Exam  Constitutional:       General: She is not in acute distress.     Appearance: Normal appearance.   Genitourinary:      Right Labia: No rash, tenderness, lesions or skin changes.     Left Labia: No tenderness, lesions, skin changes or rash.     Pelvic exam was performed with patient in the lithotomy position.   HENT:      Head: Normocephalic and atraumatic.   Cardiovascular:      Rate and Rhythm: Normal rate.   Pulmonary:      Effort: Pulmonary effort is normal. No respiratory distress.   Abdominal:      General: There is no distension.      Palpations: Abdomen is soft.      Tenderness: There is no abdominal tenderness. There is no guarding or rebound.      Comments: gravid   Neurological:      General: No focal deficit present.      Mental Status: She is alert.   Psychiatric:         Mood and Affect: Mood normal.         Behavior: Behavior normal.   Vitals and nursing note reviewed.           Fundal Height (cm): 39 cm  Fetal Heart Rate: 150    Pregravid Weight/BMI: 72.6 kg (160 lb) (BMI 27.45)  Current Weight: 92.5 kg (204 " lb)   Total Weight Gain: 20 kg (44 lb)     Pre-Josh Vitals      Flowsheet Row Most Recent Value   Prenatal Assessment    Fetal Heart Rate 150   Fundal Height (cm) 39 cm   Movement Present   Prenatal Vitals    Blood Pressure 122/72   Weight - Scale 92.5 kg (204 lb)   Urine Albumin/Glucose    Dilation/Effacement/Station    Cervical Dilation .5   Cervical Effacement 30   Fetal Station -3   Vaginal Drainage    Edema                     Problem List       Encounter for supervision of low-risk first pregnancy in third trimester    Overview     Transfer from [place] at 19 weeks  NIPT : low risk  Carrier screen: negative  Declines tdap   Delivery consent signed          Current Assessment & Plan     SVE as above  She is potentially amenable to IOL around [place]  OB precautions reviewed         Genital herpes    Overview     Will need to start valtrex @ 36 weeks         Uterine fibroid during pregnancy, antepartum    Overview      Intramural myometrium fibroid located in the right anterior corpus  region, measuring 3.3 x 2.5 x 3.7cm, Subserosal myometrium fibroid located in the right anterior corpus  region, measuring 1.6 x 1.0 x 1.7cm, Subserosal myometrium fibroid located in the left lateral corpus  region, measuring 2.5 x 1.3 x 2.1cm   Stable on growth US. EFW at 31 weeks 52nd%         Not immune to measles    Overview     Will need postpartum vaccine         38 weeks gestation of pregnancy    Group B streptococcal carriage complicating pregnancy                         Mikayla Reeder MD  2024  4:23 PM

## 2024-05-27 PROBLEM — Z3A.39 39 WEEKS GESTATION OF PREGNANCY: Status: ACTIVE | Noted: 2024-05-06

## 2024-05-28 ENCOUNTER — OFFICE VISIT (OUTPATIENT)
Dept: OBGYN CLINIC | Facility: CLINIC | Age: 34
End: 2024-05-28
Payer: COMMERCIAL

## 2024-05-28 ENCOUNTER — HOSPITAL ENCOUNTER (INPATIENT)
Facility: HOSPITAL | Age: 34
LOS: 3 days | Discharge: HOME | End: 2024-05-31
Attending: STUDENT IN AN ORGANIZED HEALTH CARE EDUCATION/TRAINING PROGRAM | Admitting: STUDENT IN AN ORGANIZED HEALTH CARE EDUCATION/TRAINING PROGRAM
Payer: COMMERCIAL

## 2024-05-28 VITALS
DIASTOLIC BLOOD PRESSURE: 80 MMHG | OXYGEN SATURATION: 99 % | SYSTOLIC BLOOD PRESSURE: 124 MMHG | HEIGHT: 64 IN | HEART RATE: 98 BPM | WEIGHT: 204 LBS | BODY MASS INDEX: 34.83 KG/M2

## 2024-05-28 DIAGNOSIS — Z34.03 ENCOUNTER FOR SUPERVISION OF LOW-RISK FIRST PREGNANCY IN THIRD TRIMESTER: ICD-10-CM

## 2024-05-28 DIAGNOSIS — O34.10 UTERINE FIBROID DURING PREGNANCY, ANTEPARTUM: ICD-10-CM

## 2024-05-28 DIAGNOSIS — A60.00 GENITAL HERPES SIMPLEX, UNSPECIFIED SITE: ICD-10-CM

## 2024-05-28 DIAGNOSIS — O9A.219 TRAUMA DURING PREGNANCY: ICD-10-CM

## 2024-05-28 DIAGNOSIS — Z3A.39 39 WEEKS GESTATION OF PREGNANCY: Primary | ICD-10-CM

## 2024-05-28 DIAGNOSIS — Z3A.39 39 WEEKS GESTATION OF PREGNANCY: ICD-10-CM

## 2024-05-28 DIAGNOSIS — O99.820 GROUP B STREPTOCOCCAL CARRIAGE COMPLICATING PREGNANCY: ICD-10-CM

## 2024-05-28 DIAGNOSIS — Z34.93 THIRD TRIMESTER PREGNANCY: Primary | ICD-10-CM

## 2024-05-28 DIAGNOSIS — D25.9 UTERINE FIBROID DURING PREGNANCY, ANTEPARTUM: ICD-10-CM

## 2024-05-28 PROBLEM — O28.8 NON-REACTIVE NST (NON-STRESS TEST): Status: ACTIVE | Noted: 2024-05-28

## 2024-05-28 LAB
ABO GROUP BLD: NORMAL
BLD GP AB SCN SERPL QL: NEGATIVE
ERYTHROCYTE [DISTWIDTH] IN BLOOD BY AUTOMATED COUNT: 13.4 % (ref 11.6–15.1)
HCT VFR BLD AUTO: 38.3 % (ref 34.8–46.1)
HGB BLD-MCNC: 13.3 G/DL (ref 11.5–15.4)
HOLD SPECIMEN: YES
MCH RBC QN AUTO: 30.2 PG (ref 26.8–34.3)
MCHC RBC AUTO-ENTMCNC: 34.7 G/DL (ref 31.4–37.4)
MCV RBC AUTO: 87 FL (ref 82–98)
PLATELET # BLD AUTO: 158 THOUSANDS/UL (ref 149–390)
PMV BLD AUTO: 11 FL (ref 8.9–12.7)
RBC # BLD AUTO: 4.4 MILLION/UL (ref 3.81–5.12)
RH BLD: POSITIVE
SPECIMEN EXPIRATION DATE: NORMAL
WBC # BLD AUTO: 8.81 THOUSAND/UL (ref 4.31–10.16)

## 2024-05-28 PROCEDURE — PNV: Performed by: OBSTETRICS & GYNECOLOGY

## 2024-05-28 PROCEDURE — 86901 BLOOD TYPING SEROLOGIC RH(D): CPT

## 2024-05-28 PROCEDURE — 59025 FETAL NON-STRESS TEST: CPT | Performed by: OBSTETRICS & GYNECOLOGY

## 2024-05-28 PROCEDURE — NC001 PR NO CHARGE: Performed by: STUDENT IN AN ORGANIZED HEALTH CARE EDUCATION/TRAINING PROGRAM

## 2024-05-28 PROCEDURE — 76815 OB US LIMITED FETUS(S): CPT | Performed by: OBSTETRICS & GYNECOLOGY

## 2024-05-28 PROCEDURE — 86900 BLOOD TYPING SEROLOGIC ABO: CPT

## 2024-05-28 PROCEDURE — 86850 RBC ANTIBODY SCREEN: CPT

## 2024-05-28 PROCEDURE — 59426 ANTEPARTUM CARE ONLY: CPT | Performed by: OBSTETRICS & GYNECOLOGY

## 2024-05-28 PROCEDURE — 85027 COMPLETE CBC AUTOMATED: CPT

## 2024-05-28 PROCEDURE — 4A1HXCZ MONITORING OF PRODUCTS OF CONCEPTION, CARDIAC RATE, EXTERNAL APPROACH: ICD-10-PCS | Performed by: OBSTETRICS & GYNECOLOGY

## 2024-05-28 PROCEDURE — 86780 TREPONEMA PALLIDUM: CPT

## 2024-05-28 RX ORDER — BUPIVACAINE HYDROCHLORIDE 2.5 MG/ML
30 INJECTION, SOLUTION EPIDURAL; INFILTRATION; INTRACAUDAL ONCE AS NEEDED
Status: DISCONTINUED | OUTPATIENT
Start: 2024-05-28 | End: 2024-05-30

## 2024-05-28 RX ORDER — SODIUM CHLORIDE, SODIUM LACTATE, POTASSIUM CHLORIDE, CALCIUM CHLORIDE 600; 310; 30; 20 MG/100ML; MG/100ML; MG/100ML; MG/100ML
125 INJECTION, SOLUTION INTRAVENOUS CONTINUOUS
Status: DISCONTINUED | OUTPATIENT
Start: 2024-05-28 | End: 2024-05-30

## 2024-05-28 RX ORDER — ONDANSETRON 2 MG/ML
4 INJECTION INTRAMUSCULAR; INTRAVENOUS EVERY 6 HOURS PRN
Status: DISCONTINUED | OUTPATIENT
Start: 2024-05-28 | End: 2024-05-30

## 2024-05-28 RX ADMIN — SODIUM CHLORIDE, SODIUM LACTATE, POTASSIUM CHLORIDE, AND CALCIUM CHLORIDE 125 ML/HR: .6; .31; .03; .02 INJECTION, SOLUTION INTRAVENOUS at 17:27

## 2024-05-28 RX ADMIN — Medication 50 MCG: at 21:32

## 2024-05-28 RX ADMIN — PENICILLIN G POTASSIUM 2.5 MILLION UNITS: 20000000 INJECTION, POWDER, FOR SOLUTION INTRAVENOUS at 21:31

## 2024-05-28 RX ADMIN — Medication 25 MCG: at 17:07

## 2024-05-28 RX ADMIN — PENICILLIN G POTASSIUM 5 MILLION UNITS: 20000000 INJECTION, POWDER, FOR SOLUTION INTRAVENOUS at 17:27

## 2024-05-28 NOTE — ASSESSMENT & PLAN NOTE
Intramural myometrium fibroid located in the right anterior corpus  region, measuring 3.3 x 2.5 x 3.7cm, Subserosal myometrium fibroid located in the right anterior corpus  region, measuring 1.6 x 1.0 x 1.7cm, Subserosal myometrium fibroid located in the left lateral corpus  region, measuring 2.5 x 1.3 x 2.1cm   Stable on growth US. EFW at 31 weeks 52nd%

## 2024-05-28 NOTE — PROGRESS NOTES
"OB/GYN  PN Visit  Kajal Tristan  [id number]  2024  11:52 AM  Dr. Helena Keene MD    S: 34 y.o.  39w3d here for PN visit.       Chief Complaint   Patient presents with    Routine Prenatal Visit     Fell the other day      She fell on her back on , not her abdomen, did not call        OB complaints:  Contractions: no  Leakage: no  Bleeding: no  Fetal movement: yes      O:  /80   Pulse 98   Ht 5' 4\" (1.626 m)   Wt 92.5 kg (204 lb)   LMP 2023 (Exact Date)   SpO2 99%   BMI 35.02 kg/m²       Review of Systems   Constitutional: Negative.    HENT: Negative.     Eyes: Negative.    Respiratory: Negative.     Cardiovascular: Negative.    Gastrointestinal: Negative.    Endocrine: Negative.    Genitourinary:         As noted in HPI   Musculoskeletal: Negative.    Skin: Negative.    Allergic/Immunologic: Negative.    Neurological: Negative.    Hematological: Negative.    Psychiatric/Behavioral: Negative.           Physical Exam  Constitutional:       General: She is not in acute distress.     Appearance: Normal appearance. She is well-developed.   Genitourinary:      Right Labia: No rash, tenderness, lesions, skin changes or Bartholin's cyst.     Left Labia: No tenderness, lesions, skin changes, Bartholin's cyst or rash.     No labial fusion noted.      No inguinal adenopathy present in the right or left side.     Pelvic exam was performed with patient in the lithotomy position.   HENT:      Head: Normocephalic.   Cardiovascular:      Rate and Rhythm: Normal rate.   Pulmonary:      Effort: Pulmonary effort is normal.   Abdominal:      General: There is no distension.      Palpations: Abdomen is soft.      Tenderness: There is no abdominal tenderness. There is no guarding.      Hernia: There is no hernia in the left inguinal area or right inguinal area.   Musculoskeletal:         General: No swelling or deformity.   Lymphadenopathy:      Lower Body: No right inguinal adenopathy. No left " inguinal adenopathy.   Neurological:      General: No focal deficit present.      Mental Status: She is alert and oriented to person, place, and time.   Skin:     General: Skin is warm and dry.   Psychiatric:         Mood and Affect: Mood normal.         Behavior: Behavior normal.   Vitals reviewed.             Pregravid Weight/BMI: 72.6 kg (160 lb) (BMI 27.45)  Current Weight: 92.5 kg (204 lb)   Total Weight Gain: 20 kg (44 lb)   Pre- Vitals      Flowsheet Row Most Recent Value   Prenatal Assessment    Prenatal Vitals    Blood Pressure 124/80   Weight - Scale 92.5 kg (204 lb)   Urine Albumin/Glucose    Dilation/Effacement/Station    Vaginal Drainage    Edema                NST  Cervix fingertip/50/-3/soft/mid    Problem List          Genitourinary    Genital herpes    Overview     Will need to start valtrex @ 36 weeks         Uterine fibroid during pregnancy, antepartum    Overview      Intramural myometrium fibroid located in the right anterior corpus  region, measuring 3.3 x 2.5 x 3.7cm, Subserosal myometrium fibroid located in the right anterior corpus  region, measuring 1.6 x 1.0 x 1.7cm, Subserosal myometrium fibroid located in the left lateral corpus  region, measuring 2.5 x 1.3 x 2.1cm   Stable on growth US. EFW at 31 weeks 52nd%            Obstetrics/Gynecology    Encounter for supervision of low-risk first pregnancy in third trimester    Overview     Transfer from [place] at 19 weeks  NIPT : low risk  Carrier screen: negative  Declines tdap   Delivery consent signed          39 weeks gestation of pregnancy    Group B streptococcal carriage complicating pregnancy       Other    Not immune to measles    Overview     Will need postpartum vaccine          Other Visit Diagnoses       Third trimester pregnancy    -  Primary           NST shows moderate variability with variable decelerations from 150s to 115 with out contractions.  NST did not show accelerations  SOURAV normal at 11.55 cm    Patient  advised to proceed to labor and delivery for further evaluation, extended monitoring and possible induction of labor especially at term with fetal heart rate decelerations.  Labor and delivery team and on-call provider was notified.        No future appointments.              Helena Keene MD  5/28/2024  11:52 AM    No

## 2024-05-28 NOTE — PATIENT INSTRUCTIONS
Pregnancy at 39 to 40 Weeks   WHAT YOU NEED TO KNOW:   What changes are happening with my body?  You are now getting close to your due date. Your due date is just an estimate of when your baby will be born. Your baby may be born before or after your due date. Your breathing may be easier if your baby has moved down into a head-down position. You may need to urinate more often because the baby may be pressing on your bladder. You may also feel more discomfort and tire easily. You may also be having trouble sleeping.  How do I care for myself at this stage of my pregnancy?       Eat a variety of healthy foods.  Healthy foods include fruits, vegetables, whole-grain breads, low-fat dairy foods, beans, lean meats, and fish. Drink liquids as directed. Ask how much liquid to drink each day and which liquids are best for you. Limit caffeine to less than 200 milligrams each day. Limit your intake of fish to 2 servings each week. Choose fish low in mercury such as canned light tuna, shrimp, salmon, cod, or tilapia. Do not  eat fish high in mercury such as swordfish, tilefish, rebecca mackerel, and shark.         Take prenatal vitamins as directed.  Your need for certain vitamins and minerals, such as folic acid, increases during pregnancy. Prenatal vitamins provide some of the extra vitamins and minerals you need. Prenatal vitamins may also help to decrease the risk of certain birth defects.         Rest as needed.  Put your feet up if you have swelling in your ankles and feet.         Talk to your healthcare provider about exercise.  Moderate exercise can help you stay fit. Your healthcare provider will help you plan an exercise program that is safe for you during pregnancy.         Do not smoke.  Smoking increases your risk of a miscarriage and other health problems during your pregnancy. Smoking can cause your baby to be born early or weigh less at birth. Ask your healthcare provider for information if you need help  quitting.    Do not drink alcohol.  Alcohol passes from your body to your baby through the placenta. It can affect your baby's brain development and cause fetal alcohol syndrome (FAS). FAS is a group of conditions that causes mental, behavior, and growth problems.    Talk to your healthcare provider before you take any medicines.  Many medicines may harm your baby if you take them when you are pregnant. Do not take any medicines, vitamins, herbs, or supplements without first talking to your healthcare provider. Never use illegal or street drugs (such as marijuana or cocaine) while you are pregnant.  What are some safety tips during pregnancy?   Avoid hot tubs and saunas.  Do not use a hot tub or sauna while you are pregnant, especially during your first trimester. Hot tubs and saunas may raise your baby's temperature and increase the risk of birth defects.    Avoid toxoplasmosis.  This is an infection caused by eating raw meat or being around infected cat feces. It can cause birth defects, miscarriages, and other problems. Wash your hands after you touch raw meat. Make sure any meat is well-cooked before you eat it. Avoid raw eggs and unpasteurized milk. Use gloves or ask someone else to clean your cat's litter box while you are pregnant.         Ask your healthcare provider about travel.  The most comfortable time to travel is during the second trimester. Ask your provider if you can travel after 36 weeks. You may not be able to travel in an airplane after 36 weeks. He or she may also recommend that you avoid long road trips.    What changes are happening with my baby?  Your baby is ready to be born. At birth, your baby may weigh about 6 to 9 pounds and be about 19 to 21 inches long. Your baby may be in a head-down position. Your baby will also rest lower in your abdomen.  What do I need to know about prenatal care?  Your healthcare provider will check your blood pressure and weight. You may also need the  following:  A urine test  may also be done to check for sugar and protein. These can be signs of gestational diabetes or infection. Protein in your urine may also be a sign of preeclampsia. Preeclampsia is a condition that can develop during week 20 or later of your pregnancy. It causes high blood pressure, and it can cause problems with your kidneys and other organs.    A gestational diabetes screen  may be done. Your healthcare provider may order either a 1-step or 2-step oral glucose tolerance test (OGTT).     1-step OGTT:  Your blood sugar level will be tested after you have not eaten for 8 hours (fasting). You will then be given a glucose drink. Your level will be tested again 1 hour and 2 hours after you finish the drink.    2-step OGTT:  You do not have to fast for the first part of the test. You will have the glucose drink at any time of day. Your blood sugar level will be checked 1 hour later. If your blood sugar is higher than a certain level, another test will be ordered. You will fast and your blood sugar level will be tested. You will have the glucose drink. Your blood will be tested again 1 hour, 2 hours, and 3 hours after you finish the glucose drink.    Your baby's heart rate  will be checked.    When should I seek immediate care?   You develop a severe headache that does not go away.    You have new or increased vision changes, such as blurred or spotted vision.    You have new or increased swelling in your face or hands.    You have vaginal spotting or bleeding.    Your water broke or you feel warm water gushing or trickling from your vagina.    When should I call my obstetrician?   You have more than 5 contractions in 1 hour.    You notice any changes in your baby's movements.    You have abdominal cramps, pressure, or tightening.    You have a change in vaginal discharge.    You have chills or a fever.    You have vaginal itching, burning, or pain.    You have yellow, green, white, or  foul-smelling vaginal discharge.    You have pain or burning when you urinate, less urine than usual, or pink or bloody urine.    You have questions or concerns about your condition or care.    CARE AGREEMENT:   You have the right to help plan your care. Learn about your health condition and how it may be treated. Discuss treatment options with your healthcare providers to decide what care you want to receive. You always have the right to refuse treatment. The above information is an  only. It is not intended as medical advice for individual conditions or treatments. Talk to your doctor, nurse or pharmacist before following any medical regimen to see if it is safe and effective for you.  © Copyright [employer] 2023 Information is for End User's use only and may not be sold, redistributed or otherwise used for commercial purposes.

## 2024-05-28 NOTE — PLAN OF CARE
Problem: BIRTH - VAGINAL/ SECTION  Goal: Fetal and maternal status remain reassuring during the birth process  Description: INTERVENTIONS:  - Monitor vital signs  - Monitor fetal heart rate  - Monitor uterine activity  - Monitor labor progression (vaginal delivery)  - DVT prophylaxis  - Antibiotic prophylaxis  Outcome: Progressing  Goal: Emotionally satisfying birthing experience for mother/fetus  Description: Interventions:  - Assess, plan, implement and evaluate the nursing care given to the patient in labor  - Advocate the philosophy that each childbirth experience is a unique experience and support the family's chosen level of involvement and control during the labor process   - Actively participate in both the patient's and family's teaching of the birth process  - Consider cultural, Restorationism and age-specific factors and plan care for the patient in labor  Outcome: Progressing     Problem: PAIN - ADULT  Goal: Verbalizes/displays adequate comfort level or baseline comfort level  Description: Interventions:  - Encourage patient to monitor pain and request assistance  - Assess pain using appropriate pain scale  - Administer analgesics based on type and severity of pain and evaluate response  - Implement non-pharmacological measures as appropriate and evaluate response  - Consider cultural and social influences on pain and pain management  - Notify physician/advanced practitioner if interventions unsuccessful or patient reports new pain  Outcome: Progressing     Problem: INFECTION - ADULT  Goal: Absence or prevention of progression during hospitalization  Description: INTERVENTIONS:  - Assess and monitor for signs and symptoms of infection  - Monitor lab/diagnostic results  - Monitor all insertion sites, i.e. indwelling lines, tubes, and drains  - Monitor endotracheal if appropriate and nasal secretions for changes in amount and color  - [place] appropriate cooling/warming therapies per order  -  Administer medications as ordered  - Instruct and encourage patient and family to use good hand hygiene technique  - Identify and instruct in appropriate isolation precautions for identified infection/condition  Outcome: Progressing  Goal: Absence of fever/infection during neutropenic period  Description: INTERVENTIONS:  - Monitor WBC    Outcome: Progressing     Problem: SAFETY ADULT  Goal: Patient will remain free of falls  Description: INTERVENTIONS:  - Educate patient/family on patient safety including physical limitations  - Instruct patient to call for assistance with activity   - Consult OT/PT to assist with strengthening/mobility   - Keep Call bell within reach  - Keep bed low and locked with side rails adjusted as appropriate  - Keep care items and personal belongings within reach  - Initiate and maintain comfort rounds  - Make Fall Risk Sign visible to staff  - Offer Toileting every  Hours, in advance of need  - Initiate/Maintain alarm  - Obtain necessary fall risk management equipment:   - Apply yellow socks and bracelet for high fall risk patients  - Consider moving patient to room near nurses station  Outcome: Progressing  Goal: Maintain or return to baseline ADL function  Description: INTERVENTIONS:  -  Assess patient's ability to carry out ADLs; assess patient's baseline for ADL function and identify physical deficits which impact ability to perform ADLs (bathing, care of mouth/teeth, toileting, grooming, dressing, etc.)  - Assess/evaluate cause of self-care deficits   - Assess range of motion  - Assess patient's mobility; develop plan if impaired  - Assess patient's need for assistive devices and provide as appropriate  - Encourage maximum independence but intervene and supervise when necessary  - Involve family in performance of ADLs  - Assess for home care needs following discharge   - Consider OT consult to assist with ADL evaluation and planning for discharge  - Provide patient education as  appropriate  Outcome: Progressing  Goal: Maintains/Returns to pre admission functional level  Description: INTERVENTIONS:  - Perform AM-PAC 6 Click Basic Mobility/ Daily Activity assessment daily.  - Set and communicate daily mobility goal to care team and patient/family/caregiver.   - Collaborate with rehabilitation services on mobility goals if consulted  - Perform Range of Motion  times a day.  - Reposition patient every  hours.  - Dangle patient  times a day  - Stand patient  times a day  - Ambulate patient  times a day  - Out of bed to chair  times a day   - Out of bed for meals  times a day  - Out of bed for toileting  - Record patient progress and toleration of activity level   Outcome: Progressing     Problem: Knowledge Deficit  Goal: Patient/family/caregiver demonstrates understanding of disease process, treatment plan, medications, and discharge instructions  Description: Complete learning assessment and assess knowledge base.  Interventions:  - Provide teaching at level of understanding  - Provide teaching via preferred learning methods  Outcome: Progressing     Problem: DISCHARGE PLANNING  Goal: Discharge to home or other facility with appropriate resources  Description: INTERVENTIONS:  - Identify barriers to discharge w/patient and caregiver  - Arrange for needed discharge resources and transportation as appropriate  - Identify discharge learning needs (meds, wound care, etc.)  - Arrange for interpretive services to assist at discharge as needed  - Refer to Case Management Department for coordinating discharge planning if the patient needs post-hospital services based on physician/advanced practitioner order or complex needs related to functional status, cognitive ability, or social support system  Outcome: Progressing

## 2024-05-28 NOTE — LETTER
[facility] LABOR AND DELIVERY  [address] [address]  Dept: [phone]    May 29, 2024     Patient: Kajal Tristan   YOB: 1990   Date of Visit: 5/28/2024       To Whom it May Concern:    Kajal Tristan is under my professional care. She was seen in the hospital starting on 5/28/2024. Her partner Romulo Elvie was present at the hospital for her care.     If you have any questions or concerns, please don't hesitate to call.         Sincerely,          Dalila Malone MD

## 2024-05-28 NOTE — H&P
H & P- Obstetrics   Kajal Tristan 34 y.o. female MRN: [id number]  Unit/Bed#: -01 Encounter: [id number]    Assessment: 34 y.o.  at 39w3d admitted for IOL after variable decelerations and no accelerations at her prenatal appointment today.  SVE: /-3  FHT: 150 bpm normal baseline, moderate variability, 15X15 accels, no decels. Reactive tracing   Clinical EFW: 52 ; Cephalic confirmed by TAUS  GBS status: positive   Postpartum contraception plan: condoms     Plan:   39 weeks gestation of pregnancy  Assessment & Plan  Admit to OBN   Clear liquid diet   F/u T&S, CBC, RPR   IVF LR 125cc/hr   Continuous fetal monitoring and tocometry   Analgesia at maternal request   Vertex by TAUS  Induction plan: FB, cyto, eventual pit titration, SROM vs AROM       Not immune to measles  Assessment & Plan  Offer vaccination post partum     Uterine fibroid during pregnancy, antepartum  Assessment & Plan  Intramural myometrium fibroid located in the right anterior corpus  region, measuring 3.3 x 2.5 x 3.7cm, Subserosal myometrium fibroid located in the right anterior corpus  region, measuring 1.6 x 1.0 x 1.7cm, Subserosal myometrium fibroid located in the left lateral corpus  region, measuring 2.5 x 1.3 x 2.1cm   Stable on growth US. EFW at 31 weeks 52nd%    Genital herpes  Assessment & Plan  Speculum exam negative on admission     * Non-reactive NST (non-stress test)  Assessment & Plan  Variable decelerations and no accelerations on NST in office today  SOURAV normal           Discussed case and plan w/ Dr. Jin      Chief Complaint: something was wrong with my baby's heart rate in the office     HPI: Kajal Tristan is a 34 y.o.  with an LOREE of 2024, by Last Menstrual Period at 39w3d who is being admitted for IOL after nonreative NST in the office notable for variable decelerations and no accelerations. She denies having uterine contractions, has no LOF, and reports no VB. She states she has felt good  FM.    Patient Active Problem List   Diagnosis    Encounter for supervision of low-risk first pregnancy in third trimester    Genital herpes    Uterine fibroid during pregnancy, antepartum    Not immune to measles    39 weeks gestation of pregnancy    Group B streptococcal carriage complicating pregnancy    Non-reactive NST (non-stress test)       Baby complications/comments: none    Review of Systems   Constitutional:  Negative for chills and fever.   HENT:  Negative for congestion, rhinorrhea, sneezing and sore throat.    Eyes:  Negative for photophobia and visual disturbance.   Respiratory:  Negative for cough and shortness of breath.    Cardiovascular:  Negative for chest pain.   Gastrointestinal:  Negative for diarrhea, nausea and vomiting.   Genitourinary:  Negative for dysuria and frequency.   Neurological:  Negative for dizziness, numbness and headaches.   Psychiatric/Behavioral: Negative.         OB Hx:  OB History    Para Term  AB Living   1             SAB IAB Ectopic Multiple Live Births                  # Outcome Date GA Lbr Navjot/2nd Weight Sex Type Anes PTL Lv   1 Current                Past Medical Hx:  Past Medical History:   Diagnosis Date    Fibroids     Herpes        Past Surgical hx:  No past surgical history on file.    Social Hx:  Alcohol use: denies  Tobacco use: denies  Other substance use: denies    Allergies   Allergen Reactions    Voltaren [Diclofenac] Rash       No medications prior to admission.    Objective:  HR:  [98] 98  BP: (119-124)/(73-80) 119/73  Body mass index is 35.02 kg/m².     Physical Exam:  Physical Exam  Constitutional:       Appearance: Normal appearance.   Genitourinary:      Genitourinary Comments: SVE as above    HENT:      Head: Normocephalic and atraumatic.      Mouth/Throat:      Mouth: Mucous membranes are moist.      Pharynx: Oropharynx is clear.   Eyes:      General: No scleral icterus.     Extraocular Movements: Extraocular movements intact.  "  Cardiovascular:      Rate and Rhythm: Normal rate and regular rhythm.      Pulses: Normal pulses.   Pulmonary:      Effort: Pulmonary effort is normal. No respiratory distress.   Abdominal:      Palpations: Abdomen is soft.      Tenderness: There is no abdominal tenderness.      Comments: Gravid   Musculoskeletal:      Right lower leg: No edema.      Left lower leg: No edema.   Neurological:      General: No focal deficit present.      Mental Status: She is alert and oriented to person, place, and time.   Skin:     General: Skin is warm and dry.   Psychiatric:         Mood and Affect: Mood normal.         Behavior: Behavior normal.   Vitals and nursing note reviewed. Exam conducted with a chaperone present.            Lab Results   Component Value Date    WBC 8.81 05/28/2024    HGB 13.3 05/28/2024    HCT 38.3 05/28/2024     05/28/2024     No results found for: \"NA\", \"K\", \"CL\", \"CO2\", \"BUN\", \"CREATININE\", \"GLUCOSE\", \"AST\", \"ALT\"  Prenatal Labs: Reviewed      Blood type: O pos  GBS: pos  HIV: NR  Rubella: nonimmune  Syphilis IgM/IgG: NR  HBsAg: NR  HCAb: neg  Chlamydia: neg  Gonorrhea: neg  Diabetes 1 hour screen: 96  3 hour glucose: n/a  Platelets: 158  Hgb: 13.3  >2 Midnights  INPATIENT     Signature/Title: Marycarmen Brock MD  Date: 5/28/2024  Time: 4:25 PM   "

## 2024-05-29 ENCOUNTER — ANESTHESIA (INPATIENT)
Dept: ANESTHESIOLOGY | Facility: HOSPITAL | Age: 34
End: 2024-05-29
Payer: COMMERCIAL

## 2024-05-29 ENCOUNTER — ANESTHESIA EVENT (INPATIENT)
Dept: ANESTHESIOLOGY | Facility: HOSPITAL | Age: 34
End: 2024-05-29
Payer: COMMERCIAL

## 2024-05-29 LAB — TREPONEMA PALLIDUM IGG+IGM AB [PRESENCE] IN SERUM OR PLASMA BY IMMUNOASSAY: NORMAL

## 2024-05-29 RX ORDER — OXYTOCIN/RINGER'S LACTATE 30/500 ML
1-30 PLASTIC BAG, INJECTION (ML) INTRAVENOUS
Status: DISCONTINUED | OUTPATIENT
Start: 2024-05-29 | End: 2024-05-30

## 2024-05-29 RX ADMIN — PENICILLIN G POTASSIUM 2.5 MILLION UNITS: 20000000 INJECTION, POWDER, FOR SOLUTION INTRAVENOUS at 06:17

## 2024-05-29 RX ADMIN — OXYTOCIN 2 MILLI-UNITS/MIN: 10 INJECTION INTRAVENOUS at 03:53

## 2024-05-29 RX ADMIN — PENICILLIN G POTASSIUM 2.5 MILLION UNITS: 20000000 INJECTION, POWDER, FOR SOLUTION INTRAVENOUS at 10:34

## 2024-05-29 RX ADMIN — PENICILLIN G POTASSIUM 2.5 MILLION UNITS: 20000000 INJECTION, POWDER, FOR SOLUTION INTRAVENOUS at 18:55

## 2024-05-29 RX ADMIN — PENICILLIN G POTASSIUM 2.5 MILLION UNITS: 20000000 INJECTION, POWDER, FOR SOLUTION INTRAVENOUS at 02:23

## 2024-05-29 RX ADMIN — PENICILLIN G POTASSIUM 2.5 MILLION UNITS: 20000000 INJECTION, POWDER, FOR SOLUTION INTRAVENOUS at 14:28

## 2024-05-29 RX ADMIN — SODIUM CHLORIDE, SODIUM LACTATE, POTASSIUM CHLORIDE, AND CALCIUM CHLORIDE 125 ML/HR: .6; .31; .03; .02 INJECTION, SOLUTION INTRAVENOUS at 03:54

## 2024-05-29 RX ADMIN — MORPHINE SULFATE 2 MG: 2 INJECTION, SOLUTION INTRAMUSCULAR; INTRAVENOUS at 23:30

## 2024-05-29 RX ADMIN — SODIUM CHLORIDE, SODIUM LACTATE, POTASSIUM CHLORIDE, AND CALCIUM CHLORIDE 125 ML/HR: .6; .31; .03; .02 INJECTION, SOLUTION INTRAVENOUS at 13:54

## 2024-05-29 RX ADMIN — SODIUM CHLORIDE, SODIUM LACTATE, POTASSIUM CHLORIDE, AND CALCIUM CHLORIDE 999 ML/HR: .6; .31; .03; .02 INJECTION, SOLUTION INTRAVENOUS at 23:48

## 2024-05-29 RX ADMIN — PENICILLIN G POTASSIUM 2.5 MILLION UNITS: 20000000 INJECTION, POWDER, FOR SOLUTION INTRAVENOUS at 22:58

## 2024-05-29 NOTE — PLAN OF CARE
Problem: BIRTH - VAGINAL/ SECTION  Goal: Fetal and maternal status remain reassuring during the birth process  Description: INTERVENTIONS:  - Monitor vital signs  - Monitor fetal heart rate  - Monitor uterine activity  - Monitor labor progression (vaginal delivery)  - DVT prophylaxis  - Antibiotic prophylaxis  Outcome: Progressing  Goal: Emotionally satisfying birthing experience for mother/fetus  Description: Interventions:  - Assess, plan, implement and evaluate the nursing care given to the patient in labor  - Advocate the philosophy that each childbirth experience is a unique experience and support the family's chosen level of involvement and control during the labor process   - Actively participate in both the patient's and family's teaching of the birth process  - Consider cultural, Adventist and age-specific factors and plan care for the patient in labor  Outcome: Progressing     Problem: PAIN - ADULT  Goal: Verbalizes/displays adequate comfort level or baseline comfort level  Description: Interventions:  - Encourage patient to monitor pain and request assistance  - Assess pain using appropriate pain scale  - Administer analgesics based on type and severity of pain and evaluate response  - Implement non-pharmacological measures as appropriate and evaluate response  - Consider cultural and social influences on pain and pain management  - Notify physician/advanced practitioner if interventions unsuccessful or patient reports new pain  Outcome: Progressing     Problem: INFECTION - ADULT  Goal: Absence or prevention of progression during hospitalization  Description: INTERVENTIONS:  - Assess and monitor for signs and symptoms of infection  - Monitor lab/diagnostic results  - Monitor all insertion sites, i.e. indwelling lines, tubes, and drains  - Monitor endotracheal if appropriate and nasal secretions for changes in amount and color  - [place] appropriate cooling/warming therapies per order  -  Administer medications as ordered  - Instruct and encourage patient and family to use good hand hygiene technique  - Identify and instruct in appropriate isolation precautions for identified infection/condition  Outcome: Progressing  Goal: Absence of fever/infection during neutropenic period  Description: INTERVENTIONS:  - Monitor WBC    Outcome: Progressing  Goal: Maintain or return to baseline ADL function  Description: INTERVENTIONS:  -  Assess patient's ability to carry out ADLs; assess patient's baseline for ADL function and identify physical deficits which impact ability to perform ADLs (bathing, care of mouth/teeth, toileting, grooming, dressing, etc.)  - Assess/evaluate cause of self-care deficits   - Assess range of motion  - Assess patient's mobility; develop plan if impaired  - Assess patient's need for assistive devices and provide as appropriate  - Encourage maximum independence but intervene and supervise when necessary  - Involve family in performance of ADLs  - Assess for home care needs following discharge   - Consider OT consult to assist with ADL evaluation and planning for discharge  - Provide patient education as appropriate  Outcome: Progressing     Problem: Knowledge Deficit  Goal: Patient/family/caregiver demonstrates understanding of disease process, treatment plan, medications, and discharge instructions  Description: Complete learning assessment and assess knowledge base.  Interventions:  - Provide teaching at level of understanding  - Provide teaching via preferred learning methods  Outcome: Progressing     Problem: DISCHARGE PLANNING  Goal: Discharge to home or other facility with appropriate resources  Description: INTERVENTIONS:  - Identify barriers to discharge w/patient and caregiver  - Arrange for needed discharge resources and transportation as appropriate  - Identify discharge learning needs (meds, wound care, etc.)  - Arrange for interpretive services to assist at discharge as  needed  - Refer to Case Management Department for coordinating discharge planning if the patient needs post-hospital services based on physician/advanced practitioner order or complex needs related to functional status, cognitive ability, or social support system  Outcome: Progressing

## 2024-05-30 PROBLEM — O13.9 GESTATIONAL HYPERTENSION: Status: ACTIVE | Noted: 2024-05-30

## 2024-05-30 LAB
ALBUMIN SERPL BCP-MCNC: 3 G/DL (ref 3.5–5)
ALP SERPL-CCNC: 157 U/L (ref 34–104)
ALT SERPL W P-5'-P-CCNC: 30 U/L (ref 7–52)
ANION GAP SERPL CALCULATED.3IONS-SCNC: 9 MMOL/L (ref 4–13)
AST SERPL W P-5'-P-CCNC: 39 U/L (ref 13–39)
BASE EXCESS BLDCOA CALC-SCNC: -8.4 MMOL/L (ref 3–11)
BASE EXCESS BLDCOV CALC-SCNC: -7.1 MMOL/L (ref 1–9)
BILIRUB SERPL-MCNC: 0.6 MG/DL (ref 0.2–1)
BUN SERPL-MCNC: 4 MG/DL (ref 5–25)
CALCIUM ALBUM COR SERPL-MCNC: 9.3 MG/DL (ref 8.3–10.1)
CALCIUM SERPL-MCNC: 8.5 MG/DL (ref 8.4–10.2)
CHLORIDE SERPL-SCNC: 102 MMOL/L (ref 96–108)
CO2 SERPL-SCNC: 19 MMOL/L (ref 21–32)
CREAT SERPL-MCNC: 0.69 MG/DL (ref 0.6–1.3)
GFR SERPL CREATININE-BSD FRML MDRD: 113 ML/MIN/1.73SQ M
GLUCOSE SERPL-MCNC: 136 MG/DL (ref 65–140)
HCO3 BLDCOA-SCNC: 18.9 MMOL/L (ref 17.3–27.3)
HCO3 BLDCOV-SCNC: 17.1 MMOL/L (ref 12.2–28.6)
O2 CT VFR BLDCOA CALC: 8.2 ML/DL
OXYHGB MFR BLDCOA: 34.6 %
OXYHGB MFR BLDCOV: 70.1 %
PCO2 BLDCOA: 44.8 MM[HG] (ref 30–60)
PCO2 BLDCOV: 31.2 MM HG (ref 27–43)
PH BLDCOA: 7.24 [PH] (ref 7.23–7.43)
PH BLDCOV: 7.36 [PH] (ref 7.19–7.49)
PO2 BLDCOA: 18.8 MM HG (ref 5–25)
PO2 BLDCOV: 28.9 MM HG (ref 15–45)
POTASSIUM SERPL-SCNC: 4.1 MMOL/L (ref 3.5–5.3)
PROT SERPL-MCNC: 6 G/DL (ref 6.4–8.4)
SAO2 % BLDCOV: 15.2 ML/DL
SODIUM SERPL-SCNC: 130 MMOL/L (ref 135–147)

## 2024-05-30 PROCEDURE — 82805 BLOOD GASES W/O2 SATURATION: CPT | Performed by: OBSTETRICS & GYNECOLOGY

## 2024-05-30 PROCEDURE — 0KQM0ZZ REPAIR PERINEUM MUSCLE, OPEN APPROACH: ICD-10-PCS | Performed by: OBSTETRICS & GYNECOLOGY

## 2024-05-30 PROCEDURE — 80053 COMPREHEN METABOLIC PANEL: CPT

## 2024-05-30 PROCEDURE — 59409 OBSTETRICAL CARE: CPT | Performed by: OBSTETRICS & GYNECOLOGY

## 2024-05-30 RX ORDER — LIDOCAINE HYDROCHLORIDE AND EPINEPHRINE 15; 5 MG/ML; UG/ML
INJECTION, SOLUTION EPIDURAL
Status: COMPLETED | OUTPATIENT
Start: 2024-05-30 | End: 2024-05-30

## 2024-05-30 RX ORDER — ROPIVACAINE HYDROCHLORIDE 2 MG/ML
INJECTION, SOLUTION EPIDURAL; INFILTRATION; PERINEURAL AS NEEDED
Status: DISCONTINUED | OUTPATIENT
Start: 2024-05-30 | End: 2024-05-30 | Stop reason: HOSPADM

## 2024-05-30 RX ORDER — BUPIVACAINE HYDROCHLORIDE 2.5 MG/ML
30 INJECTION, SOLUTION EPIDURAL; INFILTRATION; INTRACAUDAL ONCE AS NEEDED
Status: DISCONTINUED | OUTPATIENT
Start: 2024-05-30 | End: 2024-05-31 | Stop reason: HOSPADM

## 2024-05-30 RX ORDER — ONDANSETRON 2 MG/ML
4 INJECTION INTRAMUSCULAR; INTRAVENOUS EVERY 8 HOURS PRN
Status: DISCONTINUED | OUTPATIENT
Start: 2024-05-30 | End: 2024-05-31 | Stop reason: HOSPADM

## 2024-05-30 RX ORDER — ONDANSETRON 2 MG/ML
4 INJECTION INTRAMUSCULAR; INTRAVENOUS EVERY 4 HOURS PRN
Status: DISCONTINUED | OUTPATIENT
Start: 2024-05-30 | End: 2024-05-30 | Stop reason: SDUPTHER

## 2024-05-30 RX ORDER — DOCUSATE SODIUM 100 MG/1
100 CAPSULE, LIQUID FILLED ORAL 2 TIMES DAILY
Status: DISCONTINUED | OUTPATIENT
Start: 2024-05-30 | End: 2024-05-31 | Stop reason: HOSPADM

## 2024-05-30 RX ORDER — BENZOCAINE/MENTHOL 6 MG-10 MG
1 LOZENGE MUCOUS MEMBRANE DAILY PRN
Status: DISCONTINUED | OUTPATIENT
Start: 2024-05-30 | End: 2024-05-31 | Stop reason: HOSPADM

## 2024-05-30 RX ORDER — DIPHENHYDRAMINE HCL 25 MG
25 TABLET ORAL EVERY 6 HOURS PRN
Status: DISCONTINUED | OUTPATIENT
Start: 2024-05-30 | End: 2024-05-31 | Stop reason: HOSPADM

## 2024-05-30 RX ORDER — ACETAMINOPHEN 325 MG/1
650 TABLET ORAL EVERY 4 HOURS PRN
Status: DISCONTINUED | OUTPATIENT
Start: 2024-05-30 | End: 2024-05-31 | Stop reason: HOSPADM

## 2024-05-30 RX ORDER — IBUPROFEN 600 MG/1
600 TABLET ORAL EVERY 6 HOURS
Status: DISCONTINUED | OUTPATIENT
Start: 2024-05-30 | End: 2024-05-31 | Stop reason: HOSPADM

## 2024-05-30 RX ORDER — OXYTOCIN/RINGER'S LACTATE 30/500 ML
250 PLASTIC BAG, INJECTION (ML) INTRAVENOUS ONCE
Status: DISCONTINUED | OUTPATIENT
Start: 2024-05-30 | End: 2024-05-31 | Stop reason: HOSPADM

## 2024-05-30 RX ORDER — CALCIUM CARBONATE 500 MG/1
1000 TABLET, CHEWABLE ORAL DAILY PRN
Status: DISCONTINUED | OUTPATIENT
Start: 2024-05-30 | End: 2024-05-31 | Stop reason: HOSPADM

## 2024-05-30 RX ORDER — SODIUM CHLORIDE, SODIUM LACTATE, POTASSIUM CHLORIDE, CALCIUM CHLORIDE 600; 310; 30; 20 MG/100ML; MG/100ML; MG/100ML; MG/100ML
125 INJECTION, SOLUTION INTRAVENOUS CONTINUOUS
Status: DISCONTINUED | OUTPATIENT
Start: 2024-05-30 | End: 2024-05-31 | Stop reason: HOSPADM

## 2024-05-30 RX ADMIN — ROPIVACAINE HYDROCHLORIDE 5 ML: 2 INJECTION, SOLUTION EPIDURAL; INFILTRATION at 00:26

## 2024-05-30 RX ADMIN — IBUPROFEN 600 MG: 600 TABLET ORAL at 21:15

## 2024-05-30 RX ADMIN — ACETAMINOPHEN 325MG 650 MG: 325 TABLET ORAL at 16:19

## 2024-05-30 RX ADMIN — BENZOCAINE AND LEVOMENTHOL 1 APPLICATION: 200; 5 SPRAY TOPICAL at 05:58

## 2024-05-30 RX ADMIN — DOCUSATE SODIUM 100 MG: 100 CAPSULE, LIQUID FILLED ORAL at 18:06

## 2024-05-30 RX ADMIN — IBUPROFEN 600 MG: 600 TABLET ORAL at 05:58

## 2024-05-30 RX ADMIN — ROPIVACAINE HYDROCHLORIDE: 2 INJECTION, SOLUTION EPIDURAL; INFILTRATION at 00:35

## 2024-05-30 RX ADMIN — LIDOCAINE HYDROCHLORIDE AND EPINEPHRINE 3 ML: 15; 5 INJECTION, SOLUTION EPIDURAL at 00:16

## 2024-05-30 RX ADMIN — ROPIVACAINE HYDROCHLORIDE 5 ML: 2 INJECTION, SOLUTION EPIDURAL; INFILTRATION at 00:18

## 2024-05-30 RX ADMIN — ROPIVACAINE HYDROCHLORIDE 10 ML/HR: 2 INJECTION, SOLUTION EPIDURAL; INFILTRATION at 00:27

## 2024-05-30 NOTE — ANESTHESIA POSTPROCEDURE EVALUATION
Post-Op Assessment Note    CV Status:  Stable  Pain Score: 0    Pain management: adequate      Post-op block assessment: site cleaned, catheter intact and no complications   Mental Status:  Alert and awake   Hydration Status:  Euvolemic   PONV Controlled:  Controlled   Airway Patency:  Patent     Post Op Vitals Reviewed: Yes    No anethesia notable event occurred.    Staff: Anesthesiologist               BP      Temp      Pulse     Resp      SpO2

## 2024-05-30 NOTE — DISCHARGE SUMMARY
Discharge Summary - Kajal Tristan 34 y.o. female MRN: [id number]    Unit/Bed#: -01 Encounter: [id number]    Admission Date: 2024     Discharge Date: 2024    Patient Active Problem List   Diagnosis    Encounter for supervision of low-risk first pregnancy in third trimester    Genital herpes    Uterine fibroid during pregnancy, antepartum    Not immune to measles     (spontaneous vaginal delivery)    Group B streptococcal carriage complicating pregnancy    Non-reactive NST (non-stress test)    Gestational hypertension       OBGYN Practice: [facility] (Dr. Keene & Varinder)    Hospital Course:   Kajal Tristan is a 34 y.o.  who was admitted at 39w5d for or induction of labor for nonreassuring fetal heart tones.  She received cervical ripening with 2 doses of Cytotec followed by induction with IV Pitocin.  Pitocin was then turned off for category 2 tracing with late decelerations.  She received artificial rupture of membranes for thin meconium.  Pitocin was then restarted and she continued to have intermittent late decelerations with category 2 tracing.  She then progressed to complete, pushed for 32 minutes and delivered.      Delivery Findings:  Kajal delivered a viable male  on 2024 4:32 AM via Vaginal, Spontaneous . The delivery was uncomplicated    Baby's Weight: 3520 g (7 lb 12.2 oz); 124.16    Apgar scores: 8  and 9  at 1 and 5 minutes, respectively  Anesthesia: Epidural,   QBL: Non-Surgical QBL (mL): 266         was transferred to  nursery. Patient tolerated the procedure well and was transferred to recovery in stable condition.     Her post-partum course was uncomplicated.  Her post-partum pain was well controlled with oral analgesics. On day of discharge she was ambulating, voiding spontaneously, tolerating oral intake and hemodynamically stable. Mom's blood type is O positive and  Rhogam was not given.    She was discharged home on  postpartum day #1 without complications. Patient was instructed to follow up with her OB as an outpatient and was given appropriate warnings to call doctor or provider if she develops signs of infection or uncontrolled pain.    Discharge Problem List by Issue:   Gestational hypertension  Assessment & Plan  Systolic (24hrs), Av , Min:90 , Max:145      Diastolic (24hrs), Av, Min:59, Max:97      PreE Labs wnl   P:C ratio: not collected        Non-reactive NST (non-stress test)  Assessment & Plan  Variable decelerations and no accelerations on NST in office today  SOURAV normal     Not immune to measles  Assessment & Plan  Offer vaccination post partum     Uterine fibroid during pregnancy, antepartum  Assessment & Plan  Intramural myometrium fibroid located in the right anterior corpus  region, measuring 3.3 x 2.5 x 3.7cm, Subserosal myometrium fibroid located in the right anterior corpus  region, measuring 1.6 x 1.0 x 1.7cm, Subserosal myometrium fibroid located in the left lateral corpus  region, measuring 2.5 x 1.3 x 2.1cm   Stable on growth US. EFW at 31 weeks 52nd%    Genital herpes  Assessment & Plan  Speculum exam negative on admission     *  (spontaneous vaginal delivery)  Assessment & Plan  Recovering well   Encourage Ambulation  Encourage breastfeeding  GBS pos  Rh pos           Disposition: Home    Planned Readmission: No    Discharge Medications:   Please see AVS    Discharge instructions :   -Do not place anything (no partner, tampons or douche) in your vagina for 6 weeks.  -You may walk for exercise for the first 6 weeks then gradually return to your usual activities.   -Please do not drive for 1 week if you have no stitches and for 2 weeks if you have stitches or underwent a  delivery.    -You may take baths or shower per your preference.   -Please look at your bust (breasts) in the mirror daily and call your doctor for redness or tenderness or increased warmth.   - If you have had a   section please look at your incision daily as well and call provider for increasing redness or steady drainage from the incision.   -Please call your doctor's office if temperature > 100.4*F or 38* C, worsening pain or a foul discharge.    Follow Up:  - Follow up in 3 weeks for postpartum visit    Marycarmen Brock MD

## 2024-05-30 NOTE — L&D DELIVERY NOTE
DELIVERY NOTE  Kajal Tristan 34 y.o. female MRN: [id number]  Unit/Bed#:  307-01 Encounter: [id number]    Obstetrician:    Dr. Kandy Griffiths MD    Assistant:   Dr. Gerda Bui MD    Pre-Delivery Diagnosis:   Patient Active Problem List   Diagnosis    Encounter for supervision of low-risk first pregnancy in third trimester    Genital herpes    Uterine fibroid during pregnancy, antepartum    Not immune to measles     (spontaneous vaginal delivery)    Group B streptococcal carriage complicating pregnancy    Non-reactive NST (non-stress test)       Post-Delivery Diagnosis:   Same as above - Delivered  2nd degree laceration    Procedure:  Spontaneous vaginal delivery  Repair 2nd degree spontaneous laceration      Anesthesia:  epidural    Specimens:   Cord blood obtained   Placenta; normal appearing, central insertion, intact   Arterial and venous blood gases (below)     Gases:  Umbilical Cord Venous Blood Gas:  Results from last 7 days   Lab Units 24  0432   PH COV  7.356   PCO2 COV mm HG 31.2   HCO3 COV mmol/L 17.1   BASE EXC COV mmol/L -7.1*   O2 CT CD VB mL/dL 15.2   O2 HGB, VENOUS CORD % 70.1     Umbilical Cord Arterial Blood Gas:  Results from last 7 days   Lab Units 24  0432   PH COA  7.242   PCO2 COA  44.8   PO2 COA mm HG 18.8   HCO3 COA mmol/L 18.9   BASE EXC COA mmol/L -8.4*   O2 CONTENT CORD ART ml/dl 8.2   O2 HGB, ARTERIAL CORD % 34.6       Quantitative Blood Loss:   266 mL           Complications:    None    Brief Description of Labor Course:  Kajal Tristan is a 34 y.o.  female at 39w5d who was admitted to L&D for induction of labor for nonreassuring fetal heart tones.  She received cervical ripening with 2 doses of Cytotec followed by induction with IV Pitocin.  Pitocin was then turned off for category 2 tracing with late decelerations.  She received artificial rupture of membranes for thin meconium.  Pitocin was then restarted and she continued to have intermittent late  decelerations with category 2 tracing.  She then progressed to complete, pushed for 32 minutes and delivered.    Description of Delivery:   With  the assistance of maternal expulsive forces, the fetal vertex delivered spontaneously. A nuchal cord was not noted. The anterior right shoulder was delivered atraumatically with gentle downward traction. The contralateral arm was delivered with gentle upward traction and maternal expulsive forces. The remainder of the fetus delivered spontaneously at 0400, resulting in a viable male .     Upon delivery, the infant was placed on the mothers abdomen and the cord was doubly clamped and cut after 30 seconds. The  was noted to have good tone and cry spontaneously. There was no evidence of injury.  The  was passed off to  staff for evaluation. Umbilical cord blood and umbilical artery and venous gases were collected and sent to the lab. An intact placenta was delivered spontaneously using fundal massage and gentle cord traction and was noted to have a centrally-inserted 3-vessel cord. Active management of the third stage of labor was undertaken with IV pitocin at 250 milliunits/min. Bleeding was noted to be under control. A bimanual exam was performed and tone was firm.      Outcome:  Living  with APGARS 8 (1 min) and 9 (5 min).    weight: pending    Perineal Inspection/Laceration Repair  The vagina, cervix, perineum, and rectum were inspected and there was noted to be a 2nd degree laceration which was repaired with 3-0 vicryl rapide. Under adequate anesthesia, the apex of the vaginal laceration was identified and an anchoring suture was placed 1 cm above the apex.  The vaginal mucosa and underlying rectovaginal fascia were closed using a running locked 3-0 Vicryl-CT 1 suture to the hymenal ring.  The suture was then brought underneath the hymenal ring.  The suture was then placed through the bulbocavernosus muscle. Continuing  with the same suture, the transverse perineal muscles were reapproximated with 2 transverse running sutures.  The suture was brought to the posterior apex of the skin laceration and then the skin was reapproximated in a subcuticular fashion to the hymenal ring. A interrupted suture was placed in the vaginal wall for a region of mild oozing. Excellent hemostasis was achieved.     At the conclusion of the delivery, all needle, sponge, and instrument counts were noted to be correct. Patient tolerated the procedure well and was allowed to recover in labor and delivery room with family and  before being transferred to the post-partum floor.     Conclusion:  Mother and baby are currently recovering nicely in stable condition.    Attending Supervision:   Dr. Kandy Griffiths MD was present for the entire procedure.    Gerda Bui MD   OB/GYN PGY-2  2024 6:29 AM

## 2024-05-30 NOTE — ASSESSMENT & PLAN NOTE
Systolic (24hrs), Av , Min:90 , Max:145      Diastolic (24hrs), Av, Min:59, Max:97      PreE Labs wnl   P:C ratio: not collected

## 2024-05-30 NOTE — ANESTHESIA PREPROCEDURE EVALUATION
Procedure:  LABOR ANALGESIA    Relevant Problems   ANESTHESIA (within normal limits)      CARDIO (within normal limits)      GYN   (+) 39 weeks gestation of pregnancy      PULMONARY (within normal limits)        Physical Exam    Airway    Mallampati score: II  TM Distance: >3 FB  Neck ROM: full     Dental   No notable dental hx     Cardiovascular  Rhythm: regular, Rate: normal, Cardiovascular exam normal    Pulmonary  Pulmonary exam normal Breath sounds clear to auscultation    Other Findings  post-pubertal.      Anesthesia Plan  ASA Score- 2     Anesthesia Type- epidural with ASA Monitors.         Additional Monitors:     Airway Plan:            Plan Factors-    Chart reviewed.   Existing labs reviewed. Patient summary reviewed.    Patient is not a current smoker.              Induction-     Postoperative Plan-     Perioperative Resuscitation Plan - Level 1 - Full Code.       Informed Consent- Anesthetic plan and risks discussed with patient.

## 2024-05-30 NOTE — OB LABOR/OXYTOCIN SAFETY PROGRESS
Labor Progress Note - Kajal Tristan 34 y.o. female MRN: [id number]    Unit/Bed#: -01 Encounter: [id number]       Contraction Frequency (minutes): 1-6  Contraction Intensity: Mild  Uterine Activity Characteristics: Irregular  Cervical Dilation: 2-3        Cervical Effacement: 50  Fetal Station: -3  Baseline Rate (FHR): 145 bpm     FHR Category: 1               Vital Signs:   Vitals:    05/28/24 2104   BP:    Pulse:    Resp:    Temp: 97.9 °F (36.6 °C)   SpO2:        Notes/comments:   Giving 2nd PO cytotec, patient declining FB      Gerda Bui MD 5/28/2024 9:15 PM      
Labor Progress Note - Kajal Tristan 34 y.o. female MRN: [id number]    Unit/Bed#: -01 Encounter: [id number]    Dose (evan-units/min) Oxytocin: 10 evan-units/min  Contraction Frequency (minutes): 2-4  Contraction Intensity: Moderate  Uterine Activity Characteristics: Irregular  Cervical Dilation: 4        Cervical Effacement: 70  Fetal Station: -2  Baseline Rate (FHR): 140 bpm     FHR Category: 1               Vital Signs:   Vitals:    05/30/24 0244   BP: 127/66   Pulse: 92   Resp:    Temp:    SpO2:        Notes/comments:   SVE deferred      Gerda Bui MD 5/30/2024 2:57 AM      
Labor Progress Note - Kajal Tristan 34 y.o. female MRN: [id number]    Unit/Bed#: -01 Encounter: [id number]          Contraction Intensity: Mild  Uterine Activity Characteristics: Irregular  Cervical Dilation: 1        Cervical Effacement: 50  Fetal Station: -3  Baseline Rate (FHR): 150 bpm     FHR Category: 1               Vital Signs:   Vitals:    05/28/24 1600   BP: 119/73   Pulse:    Resp:    Temp:    SpO2:        Notes/comments:   Attempted and failed to place teixeira balloon.   Vaginal Cytotec placed at this time.       Marycarmen Brock MD 5/28/2024 5:15 PM      
Labor Progress Note - Kajal Tristan 34 y.o. female MRN: [id number]    Unit/Bed#: -01 Encounter: [id number]       Contraction Frequency (minutes): 1-2  Contraction Intensity: Mild  Uterine Activity Characteristics: Regular  Cervical Dilation: 3        Cervical Effacement: 60  Fetal Station: -3  Baseline Rate (FHR): 150 bpm     FHR Category: 1               Vital Signs:   Vitals:    05/28/24 2304   BP: 136/76   Pulse: (!) 114   Resp:    Temp:    SpO2:        Notes/comments:     Starting pitocin    Gerda Bui MD 5/29/2024 1:45 AM      
Oxytocin Safety Progress Check Note - Kajal Tristan 34 y.o. female MRN: [id number]    Unit/Bed#: -01 Encounter: [id number]    Dose (evan-units/min) Oxytocin: 6 evan-units/min  Contraction Frequency (minutes): 3-6  Contraction Intensity: Mild/Moderate  Uterine Activity Characteristics: Irregular  Cervical Dilation: 4        Cervical Effacement: 70  Fetal Station: -2  Baseline Rate (FHR): 135 bpm     FHR Category: 1               Vital Signs:   Vitals:    05/29/24 2300   BP:    Pulse:    Resp:    Temp: 98.3 °F (36.8 °C)   SpO2:        Notes/comments:   Patient desires epidural      Gerda Bui MD 5/29/2024 11:17 PM      
Oxytocin Safety Progress Check Note - Kajal Tristan 34 y.o. female MRN: [id number]  I  Unit/Bed#: -01 Encounter: [id number]    Dose (evan-units/min) Oxytocin: 8 evan-units/min  Contraction Frequency (minutes): 2-3  Contraction Intensity: Mild/Moderate  Uterine Activity Characteristics: Regular  Cervical Dilation: 3-4        Cervical Effacement: 60  Fetal Station: -2  Baseline Rate (FHR): 140 bpm     FHR Category: II               Vital Signs:   Vitals:    05/29/24 1252   BP: 115/58   Pulse: 80   Resp:    Temp:    SpO2:        Notes/comments:   Patient with rare decelerations that resolve with reposition. SVE as above. Continue pitocin titration. Encourage ambulation priot to epidural and AROM when patient agreeable.       Dalila Malone MD 5/29/2024 12:56 PM      
Oxytocin Safety Progress Check Note - Kajal Tristan 34 y.o. female MRN: [id number]    Unit/Bed#: -01 Encounter: [id number]    Dose (evan-units/min) Oxytocin: 4 evan-units/min  Contraction Frequency (minutes): 2-3  Contraction Intensity: Mild  Uterine Activity Characteristics: Regular  Cervical Dilation: 3-4        Cervical Effacement: 60  Fetal Station: -3  Baseline Rate (FHR): 145 bpm     FHR Category: 1               Vital Signs:   Vitals:    05/29/24 0606   BP: 111/60   Pulse: 91   Resp:    Temp:    SpO2:        Notes/comments:Titrate pit no further than 6 units for ripening      Gerda Bui MD 5/29/2024 6:23 AM      
Oxytocin Safety Progress Check Note - Kajal Tristan 34 y.o. female MRN: [id number]    Unit/Bed#: -01 Encounter: [id number]    Dose (evan-units/min) Oxytocin: 0 evan-units/min  Contraction Frequency (minutes): 3.5  Contraction Intensity: Mild/Moderate  Uterine Activity Characteristics: Regular  Cervical Dilation: 3-4        Cervical Effacement: 60  Fetal Station: -2  Baseline Rate (FHR): 140 bpm     FHR Category: I               Vital Signs:   Vitals:    05/29/24 1307   BP: 109/61   Pulse: 92   Resp:    Temp:    SpO2:        Notes/comments:   FTH cat I since pitocin was turned off. SVE unchanged. AROM for thin meconium stained fluid. Plan to start pit around 1700 if continues cat I tracing.       Dalila Malone MD 5/29/2024 4:37 PM      
Oxytocin Safety Progress Check Note - Kajal Tristan 34 y.o. female MRN: [id number]    Unit/Bed#: -01 Encounter: [id number]    Dose (evan-units/min) Oxytocin: 10 evan-units/min  Contraction Frequency (minutes): 2-4  Contraction Intensity: Moderate  Uterine Activity Characteristics: Irregular  Cervical Dilation: 5        Cervical Effacement: 80  Fetal Station: -1  Baseline Rate (FHR): 140 bpm     FHR Category: 1               Vital Signs:   Vitals:    05/30/24 0244   BP: 127/66   Pulse: 92   Resp:    Temp:    SpO2:        Notes/comments:   Continue laboring      Gerda Bui MD 5/30/2024 3:07 AM      
Oxytocin Safety Progress Check Note - Kajal Tristan 34 y.o. female MRN: [id number]    Unit/Bed#: -01 Encounter: [id number]    Dose (evan-units/min) Oxytocin: 4 evan-units/min  Contraction Frequency (minutes): 2-3  Contraction Intensity: Mild  Uterine Activity Characteristics: Regular  Cervical Dilation: 3        Cervical Effacement: 60  Fetal Station: -3  Baseline Rate (FHR): 145 bpm     FHR Category: 1               Vital Signs:   Vitals:    05/29/24 0453   BP: 105/58   Pulse: 91   Resp:    Temp:    SpO2:        Notes/comments:   BRANDYE amy Bui MD 5/29/2024 5:46 AM      
Oxytocin Safety Progress Check Note - Kajal Tristan 34 y.o. female MRN: [id number]    Unit/Bed#: -01 Encounter: [id number]    Dose (evan-units/min) Oxytocin: 6 evan-units/min  Contraction Frequency (minutes): 2-3  Contraction Intensity: Mild/Moderate  Uterine Activity Characteristics: Regular  Cervical Dilation: 3-4        Cervical Effacement: 60  Fetal Station: -2  Baseline Rate (FHR): 140 bpm     FHR Category: II               Vital Signs:   Vitals:    05/29/24 1307   BP: 109/61   Pulse: 92   Resp:    Temp:    SpO2:        Notes/comments:   Patient having recurrent late decelerations. Initially improving with reposition and fluids and decrease in pit. Pitocin now turned off. SVE unchanged. Plan to allow fetus to recover then AROM or restart pit.     Dalila Malone MD 5/29/2024 3:05 PM      
headache/fever

## 2024-05-30 NOTE — PLAN OF CARE
Problem: PAIN - ADULT  Goal: Verbalizes/displays adequate comfort level or baseline comfort level  Description: Interventions:  - Encourage patient to monitor pain and request assistance  - Assess pain using appropriate pain scale  - Administer analgesics based on type and severity of pain and evaluate response  - Implement non-pharmacological measures as appropriate and evaluate response  - Consider cultural and social influences on pain and pain management  - Notify physician/advanced practitioner if interventions unsuccessful or patient reports new pain  Outcome: Progressing     Problem: INFECTION - ADULT  Goal: Absence or prevention of progression during hospitalization  Description: INTERVENTIONS:  - Assess and monitor for signs and symptoms of infection  - Monitor lab/diagnostic results  - Monitor all insertion sites, i.e. indwelling lines, tubes, and drains  - Monitor endotracheal if appropriate and nasal secretions for changes in amount and color  - [place] appropriate cooling/warming therapies per order  - Administer medications as ordered  - Instruct and encourage patient and family to use good hand hygiene technique  - Identify and instruct in appropriate isolation precautions for identified infection/condition  Outcome: Progressing  Goal: Absence of fever/infection during neutropenic period  Description: INTERVENTIONS:  - Monitor WBC    Outcome: Progressing     Problem: SAFETY ADULT  Goal: Patient will remain free of falls  Description: INTERVENTIONS:  - Educate patient/family on patient safety including physical limitations  - Instruct patient to call for assistance with activity   - Consult OT/PT to assist with strengthening/mobility   - Keep Call bell within reach  - Keep bed low and locked with side rails adjusted as appropriate  - Keep care items and personal belongings within reach  - Initiate and maintain comfort rounds    - Apply yellow socks and bracelet for high fall risk patients  -  Consider moving patient to room near nurses station  Outcome: Progressing  Goal: Maintain or return to baseline ADL function  Description: INTERVENTIONS:  -  Assess patient's ability to carry out ADLs; assess patient's baseline for ADL function and identify physical deficits which impact ability to perform ADLs (bathing, care of mouth/teeth, toileting, grooming, dressing, etc.)  - Assess/evaluate cause of self-care deficits   - Assess range of motion  - Assess patient's mobility; develop plan if impaired  - Assess patient's need for assistive devices and provide as appropriate  - Encourage maximum independence but intervene and supervise when necessary  - Involve family in performance of ADLs  - Assess for home care needs following discharge   - Consider OT consult to assist with ADL evaluation and planning for discharge  - Provide patient education as appropriate  Outcome: Progressing  Goal: Maintains/Returns to pre admission functional level  Description: INTERVENTIONS:  - Perform AM-PAC 6 Click Basic Mobility/ Daily Activity assessment daily.  - Set and communicate daily mobility goal to care team and patient/family/caregiver.   - Collaborate with rehabilitation services on mobility goals if consulted    Problem: Knowledge Deficit  Goal: Patient/family/caregiver demonstrates understanding of disease process, treatment plan, medications, and discharge instructions  Description: Complete learning assessment and assess knowledge base.  Interventions:  - Provide teaching at level of understanding  - Provide teaching via preferred learning methods  Outcome: Progressing     Problem: DISCHARGE PLANNING  Goal: Discharge to home or other facility with appropriate resources  Description: INTERVENTIONS:  - Identify barriers to discharge w/patient and caregiver  - Arrange for needed discharge resources and transportation as appropriate  - Identify discharge learning needs (meds, wound care, etc.)  - Arrange for interpretive  services to assist at discharge as needed  - Refer to Case Management Department for coordinating discharge planning if the patient needs post-hospital services based on physician/advanced practitioner order or complex needs related to functional status, cognitive ability, or social support system  Outcome: Progressing     - Record patient progress and toleration of activity level   Outcome: Progressing

## 2024-05-30 NOTE — LACTATION NOTE
This note was copied from a baby's chart.  CONSULT - LACTATION  Baby Boy (Kajal) Tristan 0 days male MRN: [id number]    [facility] NURSERY Room / Bed: (N)/(N) Encounter: [id number]    Maternal Information     MOTHER:  Kajal Tristan  Maternal Age: 34 y.o.  OB History: # 1 - Date: 24, Sex: Male, Weight: 3520 g (7 lb 12.2 oz), GA: 39w5d, Type: Vaginal, Spontaneous, Apgar1: 8, Apgar5: 9, Living: Living, Birth Comments: None   Previouse breast reduction surgery? No    Lactation history:   Has patient previously breast fed: No   How long had patient previously breast fed:     Previous breast feeding complications:     No past surgical history on file.    Birth information:  YOB: 2024   Time of birth: 4:32 AM   Sex: male   Delivery type: Vaginal, Spontaneous   Birth Weight: 3520 g (7 lb 12.2 oz)   Percent of Weight Change: 0%     Gestational Age: 39w5d   [unfilled]    Assessment     Breast and nipple assessment:  Denies need for assistance at this time    Comfort Assessment: sleepy    Feeding assessment: feeding well as per NEW Mom & staff    LATCH:  Latch: Grasps breast, tongue down, lips flanged, rhythmic sucking   Audible Swallowing: A few with stimulation   Type of Nipple: Everted (After stimulation)   Comfort (Breast/Nipple): Soft/non-tender   Hold (Positioning): Partial assist, teach one side, mother does other, staff holds   LATCH Score: 8          Feeding recommendations:  breast feed on demand    Met with mother. Provided  with Ready, Set, Baby booklet which contained information on:  Hand expression with access to QR codes to review hand expression.  Positioning and latch reviewed as well as showing images of other feeding positions.  Discussed the properties of a good latch in any position.   Feeding on cue and what that means for recognizing infant's hunger, s/s that baby is getting enough milk and some s/s that breastfeeding dyad may  need further help Denies need at this time. Family support at bedside.   Skin to Skin contact and benefits to mom and baby  Avoidance of pacifiers for the first month discussed.   Gave information on common concerns, what to expect the first few weeks after delivery, preparing for other caregivers, and how partners can help. Resources for support also provided.      Also reviewed discharge breastfeeding booklet including the feeding log. Emphasized 8 or more (12) feedings in a 24 hour period, what to expect for the number of diapers per day of life and the progression of properties of the  stooling pattern.    List of reasons to call a lactation consultant.  Feeding logs  Feeding cues  Hand expression  Baby's Second day (cluster feeding)  Breastfeeding and Your Lifestyle (Medications, Alcohol, Caffeine, Smoking, Street Drugs, Methadone)  First Two Weeks Survival Guide for Breastfeeding  Breast Changes  Physical Therapy  Storage and Handling of Breast milk  How to Keep Your Breast Pump Kit Clean  The Employed Breastfeeding Mother  Mixed feeding  Bottle feeding like breastfeeding (paced bottle feeding)  astfeeding and your lifestyle, storage and preparation of breast milk, how to keep you breast pump clean, the employed breastfeeding mother and paced bottle feeding handouts.     Booklet included Breastfeeding Resources for after discharge including access to the number for the [facility].    [employer] [facility]  to call for assistance, questions and concerns.  Extension number for inpatient lactation support provided.      La Dacosta RN 2024 4:03 PM

## 2024-05-30 NOTE — ANESTHESIA PROCEDURE NOTES
Epidural Block    Patient location during procedure: OB  Start time: 5/30/2024 12:15 AM  Reason for block: primary anesthetic  Staffing  Performed by: Shira Fuentes DO  Authorized by: Shira Fuentes DO    Preanesthetic Checklist  Completed: patient identified, IV checked, site marked, risks and benefits discussed, surgical consent, monitors and equipment checked, pre-op evaluation and timeout performed  Epidural  Patient position: sitting  Prep: Betadine  Sedation Level: no sedation  Patient monitoring: heart rate, frequent blood pressure checks and continuous pulse oximetry  Approach: midline  Location: lumbar, L3-4  Injection technique: GERMAN air  Needle  Needle type: Tuohy   Needle gauge: 17 G  Needle insertion depth: 5 cm  Catheter type: side hole  Catheter size: 20 G  Catheter at skin depth: 9 cm  Catheter securement method: clear occlusive dressing  Test dose: negativelidocaine-epinephrine (XYLOCAINE-MPF/EPINEPHRINE) 1.5 %-1:200,000 injection 3 mL - Epidural   3 mL - 5/30/2024 12:16:00 AM  Assessment  Number of attempts: 1negative aspiration for CSF, negative aspiration for heme and no paresthesia on injection  patient tolerated the procedure well with no immediate complications

## 2024-05-30 NOTE — PLAN OF CARE
Problem: BIRTH - VAGINAL/ SECTION  Goal: Fetal and maternal status remain reassuring during the birth process  Description: INTERVENTIONS:  - Monitor vital signs  - Monitor fetal heart rate  - Monitor uterine activity  - Monitor labor progression (vaginal delivery)  - DVT prophylaxis  - Antibiotic prophylaxis  Outcome: Completed  Goal: Emotionally satisfying birthing experience for mother/fetus  Description: Interventions:  - Assess, plan, implement and evaluate the nursing care given to the patient in labor  - Advocate the philosophy that each childbirth experience is a unique experience and support the family's chosen level of involvement and control during the labor process   - Actively participate in both the patient's and family's teaching of the birth process  - Consider cultural, Judaism and age-specific factors and plan care for the patient in labor  Outcome: Completed     Problem: PAIN - ADULT  Goal: Verbalizes/displays adequate comfort level or baseline comfort level  Description: Interventions:  - Encourage patient to monitor pain and request assistance  - Assess pain using appropriate pain scale  - Administer analgesics based on type and severity of pain and evaluate response  - Implement non-pharmacological measures as appropriate and evaluate response  - Consider cultural and social influences on pain and pain management  - Notify physician/advanced practitioner if interventions unsuccessful or patient reports new pain  Outcome: Progressing     Problem: INFECTION - ADULT  Goal: Absence or prevention of progression during hospitalization  Description: INTERVENTIONS:  - Assess and monitor for signs and symptoms of infection  - Monitor lab/diagnostic results  - Monitor all insertion sites, i.e. indwelling lines, tubes, and drains  - Monitor endotracheal if appropriate and nasal secretions for changes in amount and color  - [place] appropriate cooling/warming therapies per order  - Administer  medications as ordered  - Instruct and encourage patient and family to use good hand hygiene technique  - Identify and instruct in appropriate isolation precautions for identified infection/condition  Outcome: Progressing  Goal: Absence of fever/infection during neutropenic period  Description: INTERVENTIONS:  - Monitor WBC    Outcome: Progressing     Problem: SAFETY ADULT  Goal: Patient will remain free of falls  Description: INTERVENTIONS:  - Educate patient/family on patient safety including physical limitations  - Instruct patient to call for assistance with activity   - Consult OT/PT to assist with strengthening/mobility   - Keep Call bell within reach  - Keep bed low and locked with side rails adjusted as appropriate  - Keep care items and personal belongings within reach  - Initiate and maintain comfort rounds  - Make Fall Risk Sign visible to staff  - Apply yellow socks and bracelet for high fall risk patients  - Consider moving patient to room near nurses station  Outcome: Progressing  Goal: Maintain or return to baseline ADL function  Description: INTERVENTIONS:  -  Assess patient's ability to carry out ADLs; assess patient's baseline for ADL function and identify physical deficits which impact ability to perform ADLs (bathing, care of mouth/teeth, toileting, grooming, dressing, etc.)  - Assess/evaluate cause of self-care deficits   - Assess range of motion  - Assess patient's mobility; develop plan if impaired  - Assess patient's need for assistive devices and provide as appropriate  - Encourage maximum independence but intervene and supervise when necessary  - Involve family in performance of ADLs  - Assess for home care needs following discharge   - Consider OT consult to assist with ADL evaluation and planning for discharge  - Provide patient education as appropriate  Outcome: Progressing  Goal: Maintains/Returns to pre admission functional level  Description: INTERVENTIONS:  - Perform AM-PAC 6 Click  Basic Mobility/ Daily Activity assessment daily.  - Set and communicate daily mobility goal to care team and patient/family/caregiver.   - Collaborate with rehabilitation services on mobility goals if consulted  - Out of bed for toileting  - Record patient progress and toleration of activity level   Outcome: Progressing     Problem: Knowledge Deficit  Goal: Patient/family/caregiver demonstrates understanding of disease process, treatment plan, medications, and discharge instructions  Description: Complete learning assessment and assess knowledge base.  Interventions:  - Provide teaching at level of understanding  - Provide teaching via preferred learning methods  Outcome: Progressing     Problem: DISCHARGE PLANNING  Goal: Discharge to home or other facility with appropriate resources  Description: INTERVENTIONS:  - Identify barriers to discharge w/patient and caregiver  - Arrange for needed discharge resources and transportation as appropriate  - Identify discharge learning needs (meds, wound care, etc.)  - Arrange for interpretive services to assist at discharge as needed  - Refer to Case Management Department for coordinating discharge planning if the patient needs post-hospital services based on physician/advanced practitioner order or complex needs related to functional status, cognitive ability, or social support system  Outcome: Progressing

## 2024-05-31 VITALS
RESPIRATION RATE: 16 BRPM | HEIGHT: 64 IN | TEMPERATURE: 97.8 F | OXYGEN SATURATION: 100 % | HEART RATE: 80 BPM | DIASTOLIC BLOOD PRESSURE: 70 MMHG | WEIGHT: 204 LBS | SYSTOLIC BLOOD PRESSURE: 117 MMHG | BODY MASS INDEX: 34.83 KG/M2

## 2024-05-31 PROCEDURE — NC001 PR NO CHARGE: Performed by: STUDENT IN AN ORGANIZED HEALTH CARE EDUCATION/TRAINING PROGRAM

## 2024-05-31 PROCEDURE — 99024 POSTOP FOLLOW-UP VISIT: CPT | Performed by: STUDENT IN AN ORGANIZED HEALTH CARE EDUCATION/TRAINING PROGRAM

## 2024-05-31 RX ORDER — ACETAMINOPHEN 325 MG/1
650 TABLET ORAL EVERY 4 HOURS PRN
Start: 2024-05-31

## 2024-05-31 RX ORDER — BENZOCAINE/MENTHOL 6 MG-10 MG
1 LOZENGE MUCOUS MEMBRANE DAILY PRN
Start: 2024-05-31

## 2024-05-31 RX ORDER — IBUPROFEN 600 MG/1
600 TABLET ORAL EVERY 6 HOURS PRN
Qty: 30 TABLET | Refills: 0 | Status: SHIPPED | OUTPATIENT
Start: 2024-05-31

## 2024-05-31 RX ORDER — DOCUSATE SODIUM 100 MG/1
100 CAPSULE, LIQUID FILLED ORAL 2 TIMES DAILY
Start: 2024-05-31

## 2024-05-31 RX ADMIN — DOCUSATE SODIUM 100 MG: 100 CAPSULE, LIQUID FILLED ORAL at 09:46

## 2024-05-31 RX ADMIN — IBUPROFEN 600 MG: 600 TABLET ORAL at 03:29

## 2024-05-31 RX ADMIN — IBUPROFEN 600 MG: 600 TABLET ORAL at 09:46

## 2024-05-31 NOTE — UTILIZATION REVIEW
"Request for Authorization for 2 day Labor     NOTIFICATION OF INPATIENT ADMISSION   MATERNITY/DELIVERY AUTHORIZATION REQUEST   SERVICING FACILITY:   [facility] - L&D, [place], NICU  [address] [place] [address]  Tax ID: [id number]  NPI: [id number] ATTENDING PROVIDER:  Attending Name and NPI#: Kandy Griffiths Md [[id number]]  Address: [address] [place] [address]  Phone: [phone]     ADMISSION INFORMATION:  Place of Service: Inpatient [facility]  Place of Service Code: 21  Inpatient Admission Date/Time: 24  3:55 PM  Discharge Date/Time: No discharge date for patient encounter.  Admitting Diagnosis Code/Description:  Decreased fetal movement [O36.8190]   Mother: Kajal Tristan 1990 Estimated Date of Delivery: 24  Delivering clinician:     OB History          1    Para   1    Term   1            AB        Living   1         SAB        IAB        Ectopic        Multiple   0    Live Births   1               [place] Name & MRN:   Information for the patient's :  Tristan, Baby Boy (Kajal) [[id number]]   [place] Delivery Information:  Sex: male  Delivered 2024 4:32 AM by Vaginal, Spontaneous; Gestational Age: 39w5d     Measurements:  Weight: 7 lb 12.2 oz (3520 g);  Height: 19.5\"    APGAR 1 minute 5 minutes 10 minutes   Totals: 8 9       UTILIZATION REVIEW CONTACT:  Vickie Osullivan, Utilization   Network Utilization Review Department  Phone: [phone]  Fax [phone]  Email: [email or web address]  Contact for approvals/pending authorizations, clinical reviews, and discharge.   PHYSICIAN ADVISORY SERVICES:  Medical Necessity Denial & [facility]-dm-[facility]iz Review  Phone: [phone]  Fax: [phone]  Email: [email or web address]   DISCHARGE SUPPORT TEAM:  For Patients Discharge Needs & Updates  Phone: [phone] opt. 2 Fax: [phone]  Email: " [email or web address]

## 2024-05-31 NOTE — PLAN OF CARE

## 2024-05-31 NOTE — PROGRESS NOTES
Progress Note - OB/GYN  Kajal Tristan 34 y.o. female MRN: [id number]  Unit/Bed#: -01 Encounter: [id number]    Assessment and Plan     Kajal Tristan is a patient of: [facility] (Dr. Keene & Varinder). She is PPD# 1 s/p  spontaneous vaginal delivery  Recovering well and is stable       Gestational hypertension  Assessment & Plan  Systolic (24hrs), Av , Min:90 , Max:145      Diastolic (24hrs), Av, Min:59, Max:97      PreE Labs wnl   P:C ratio: not collected        Non-reactive NST (non-stress test)  Assessment & Plan  Variable decelerations and no accelerations on NST in office today  SOURAV normal     Not immune to measles  Assessment & Plan  Offer vaccination post partum     Uterine fibroid during pregnancy, antepartum  Assessment & Plan  Intramural myometrium fibroid located in the right anterior corpus  region, measuring 3.3 x 2.5 x 3.7cm, Subserosal myometrium fibroid located in the right anterior corpus  region, measuring 1.6 x 1.0 x 1.7cm, Subserosal myometrium fibroid located in the left lateral corpus  region, measuring 2.5 x 1.3 x 2.1cm   Stable on growth US. EFW at 31 weeks 52nd%    Genital herpes  Assessment & Plan  Speculum exam negative on admission     *  (spontaneous vaginal delivery)  Assessment & Plan  Recovering well   Encourage Ambulation  Encourage breastfeeding  GBS pos  Rh pos          Disposition    - Anticipate discharge home on PPD# 1-2      Subjective/Objective     Chief Complaint: Postpartum State     Subjective:    Kajal Tristan is PPD#1 s/p  spontaneous vaginal delivery. She has no current complaints.  Pain is well controlled.  Patient is currently voiding.  She is ambulating.  Patient is currently passing flatus and has had no bowel movement. She is tolerating PO, and denies nausea or vomitting. Patient denies fever, chills, chest pain, shortness of breath, or calf tenderness. Lochia is normal. She is  Breastfeeding. She is recovering well and is  "stable. She desires discharge today if she and baby are both cleared.         Vitals:   /76 (BP Location: Right arm)   Pulse 87   Temp 97.5 °F (36.4 °C) (Temporal)   Resp 18   Ht 5' 4\" (1.626 m)   Wt 92.5 kg (204 lb)   LMP 08/26/2023 (Exact Date)   SpO2 100%   Breastfeeding Yes   BMI 35.02 kg/m²       Intake/Output Summary (Last 24 hours) at 5/31/2024 0421  Last data filed at 5/30/2024 1005  Gross per 24 hour   Intake --   Output 1866 ml   Net -1866 ml       Invasive Devices       Peripheral Intravenous Line  Duration             Peripheral IV 05/28/24 Left;Ventral (anterior) Forearm 2 days                    Physical Exam:   GEN: Kajal Tristan appears well, alert and oriented x 3, pleasant and cooperative   CARDIO: RRR, no murmurs or rubs  RESP:  CTAB, no wheezes or rales  ABDOMEN: soft, no tenderness, no distention, fundus @ U-2  EXTREMITIES: non tender, no erythema  Labs:     Hemoglobin   Date Value Ref Range Status   05/28/2024 13.3 11.5 - 15.4 g/dL Final   03/07/2024 12.9 11.5 - 15.4 g/dL Final     WBC   Date Value Ref Range Status   05/28/2024 8.81 4.31 - 10.16 Thousand/uL Final   03/07/2024 7.28 4.31 - 10.16 Thousand/uL Final     Platelets   Date Value Ref Range Status   05/28/2024 158 149 - 390 Thousands/uL Final   03/07/2024 180 149 - 390 Thousands/uL Final     Creatinine   Date Value Ref Range Status   05/30/2024 0.69 0.60 - 1.30 mg/dL Final     Comment:     Standardized to IDMS reference method     AST   Date Value Ref Range Status   05/30/2024 39 13 - 39 U/L Final     ALT   Date Value Ref Range Status   05/30/2024 30 7 - 52 U/L Final     Comment:     Specimen collection should occur prior to Sulfasalazine administration due to the potential for falsely depressed results.           Marycarmen Brock MD  5/31/2024  4:21 AM                       "

## 2024-05-31 NOTE — PLAN OF CARE
Problem: PAIN - ADULT  Goal: Verbalizes/displays adequate comfort level or baseline comfort level  Description: Interventions:  - Encourage patient to monitor pain and request assistance  - Assess pain using appropriate pain scale  - Administer analgesics based on type and severity of pain and evaluate response  - Implement non-pharmacological measures as appropriate and evaluate response  - Consider cultural and social influences on pain and pain management  - Notify physician/advanced practitioner if interventions unsuccessful or patient reports new pain  Outcome: Progressing     Problem: INFECTION - ADULT  Goal: Absence or prevention of progression during hospitalization  Description: INTERVENTIONS:  - Assess and monitor for signs and symptoms of infection  - Monitor lab/diagnostic results  - Monitor all insertion sites, i.e. indwelling lines, tubes, and drains  - Monitor endotracheal if appropriate and nasal secretions for changes in amount and color  - [place] appropriate cooling/warming therapies per order  - Administer medications as ordered  - Instruct and encourage patient and family to use good hand hygiene technique  - Identify and instruct in appropriate isolation precautions for identified infection/condition  Outcome: Progressing  Goal: Absence of fever/infection during neutropenic period  Description: INTERVENTIONS:  - Monitor WBC    Outcome: Progressing     Problem: SAFETY ADULT  Goal: Patient will remain free of falls  Description: INTERVENTIONS:  - Educate patient/family on patient safety including physical limitations  - Instruct patient to call for assistance with activity   - Consult OT/PT to assist with strengthening/mobility   - Keep Call bell within reach  - Keep bed low and locked with side rails adjusted as appropriate  - Keep care items and personal belongings within reach  - Initiate and maintain comfort rounds  - Make Fall Risk Sign visible to staff  - Offer Toileting every  Hours,  in advance of need  - Initiate/Maintain alarm  - Obtain necessary fall risk management equipment:   - Apply yellow socks and bracelet for high fall risk patients  - Consider moving patient to room near nurses station  Outcome: Progressing  Goal: Maintain or return to baseline ADL function  Description: INTERVENTIONS:  -  Assess patient's ability to carry out ADLs; assess patient's baseline for ADL function and identify physical deficits which impact ability to perform ADLs (bathing, care of mouth/teeth, toileting, grooming, dressing, etc.)  - Assess/evaluate cause of self-care deficits   - Assess range of motion  - Assess patient's mobility; develop plan if impaired  - Assess patient's need for assistive devices and provide as appropriate  - Encourage maximum independence but intervene and supervise when necessary  - Involve family in performance of ADLs  - Assess for home care needs following discharge   - Consider OT consult to assist with ADL evaluation and planning for discharge  - Provide patient education as appropriate  Outcome: Progressing  Goal: Maintains/Returns to pre admission functional level  Description: INTERVENTIONS:  - Perform AM-PAC 6 Click Basic Mobility/ Daily Activity assessment daily.  - Set and communicate daily mobility goal to care team and patient/family/caregiver.   - Collaborate with rehabilitation services on mobility goals if consulted  - Perform Range of Motion  times a day.  - Reposition patient every  hours.  - Dangle patient  times a day  - Stand patient  times a day  - Ambulate patient  times a day  - Out of bed to chair  times a day   - Out of bed for meals times a day  - Out of bed for toileting  - Record patient progress and toleration of activity level   Outcome: Progressing     Problem: Knowledge Deficit  Goal: Patient/family/caregiver demonstrates understanding of disease process, treatment plan, medications, and discharge instructions  Description: Complete learning  assessment and assess knowledge base.  Interventions:  - Provide teaching at level of understanding  - Provide teaching via preferred learning methods  Outcome: Progressing     Problem: DISCHARGE PLANNING  Goal: Discharge to home or other facility with appropriate resources  Description: INTERVENTIONS:  - Identify barriers to discharge w/patient and caregiver  - Arrange for needed discharge resources and transportation as appropriate  - Identify discharge learning needs (meds, wound care, etc.)  - Arrange for interpretive services to assist at discharge as needed  - Refer to Case Management Department for coordinating discharge planning if the patient needs post-hospital services based on physician/advanced practitioner order or complex needs related to functional status, cognitive ability, or social support system  Outcome: Progressing

## 2024-05-31 NOTE — UTILIZATION REVIEW
Initial Clinical Review    Admission: Date/Time/Statement:   Admission Orders (From admission, onward)       Ordered        24 1555  Inpatient Admission  Once                          Orders Placed This Encounter   Procedures    Inpatient Admission     Standing Status:   Standing     Number of Occurrences:   1     Order Specific Question:   Level of Care     Answer:   Med Surg [16]     Order Specific Question:   Estimated length of stay     Answer:   More than 2 Midnights     Order Specific Question:   Certification     Answer:   I certify that inpatient services are medically necessary for this patient for a duration of greater than two midnights. See H&P and MD Progress Notes for additional information about the patient's course of treatment.     ED Arrival Information       Patient not seen in ED                           Initial Presentation: 34 y.o. female  at 39w3d Inpatient admission due to eIOL after variable decelerations and no accelerations at her prenatal appointment today.  EXAM  SVE: /-3; FHT: 150 bpm normal baseline, moderate variability, 15X15 accels, no decels. Reactive tracing , Cephalic  by TAUS  GBS status: positive   PLAN  IVF, IV Antibx; continuous fetal HR monitoring; IOL w  multiple Cytotec, IV Pit    2024 @ 0432  viable baby boy apgars 8&9  Birthwt = 3520 g (7 lb 12.2 oz)  Date: 2024   Day 2:   @ 1:45 AM  Starting IV Pit  Contraction Frequency (minutes): 1-2  Contraction Intensity: Mild  Uterine Activity Characteristics: Regular  Cervical Dilation: 3  Cervical Effacement: 60  Fetal Station: -3  Baseline Rate (FHR): 150 bpm  FHR Category: 1     @ 12:56 PM Rare decel FHT  Oxytocin: 8 evan-units/min  Contraction Frequency (minutes): 2-3  Contraction Intensity: Mild/Moderate  Uterine Activity Characteristics: Regular  Cervical Dilation: 3-4  Cervical Effacement: 60  Fetal Station: -2  Baseline Rate (FHR): 140 bpm  FHR Category: II    @ 4:37 PM AROM for thin meconium  fluid  Oxytocin: 0 evan-units/min  Contraction Frequency (minutes): 3.5  Contraction Intensity: Mild/Moderate  Uterine Activity Characteristics: Regular  Cervical Dilation: 3-4  Cervical Effacement: 60  Fetal Station: -2  Baseline Rate (FHR): 140 bpm  FHR Category: I     DATE 2024  DAY 3  Epidural   2024 12:15 AM     @ 3:07 AM  Oxytocin: 10 evan-units/min  Contraction Frequency (minutes): 2-4  Contraction Intensity: Moderate  Uterine Activity Characteristics: Irregular  Cervical Dilation: 5  Cervical Effacement: 80  Fetal Station: -1  Baseline Rate (FHR): 140 bpm  FHR Category: 1    2024 @ 0432  viable baby boy apgars 8&9  Birthwt = 3520 g (7 lb 12.2 oz)    Triage Vitals   Temperature Pulse Respirations Blood Pressure SpO2   24 1548 24 1548 24 1548 24 1600 24 1548   98.1 °F (36.7 °C) 99 18 119/73 99 %      Temp Source Heart Rate Source Patient Position - Orthostatic VS BP Location FiO2 (%)   24 1548 24 1548 24 1900 24 1600 --   Oral Monitor Lying Right arm       Pain Score       24 1510       No Pain          Wt Readings from Last 1 Encounters:   24 92.5 kg (204 lb)     Additional Vital Signs:   Date/Time Temp Pulse Resp BP SpO2 O2 Device Cardiac (WDL) Patient Position - Orthostatic VS   24 0745 -- -- -- -- -- None (Room air) WDL --   24 0701 97.8 °F (36.6 °C) 80 16 117/70 -- -- -- --   24 0300 97.5 °F (36.4 °C) 87 18 109/76 100 % None (Room air) -- Sitting   24 2300 97.9 °F (36.6 °C) 96 18 90/66 100 % None (Room air) WDL Sitting   24 1900 98 °F (36.7 °C) 91 16 109/78 100 % None (Room air) -- Lying   24 1500 -- -- -- -- -- -- WDL --   24 1456 98.1 °F (36.7 °C) 93 18 135/97 -- -- -- --   24 0900 -- -- -- -- -- -- WDL --   24 0855 98.2 °F (36.8 °C) 107 Abnormal  20 124/68 -- None (Room air) -- --   24 0658 -- -- -- -- -- None (Room air) WDL --   24 0640 98.5 °F (36.9  "°C) 110 Abnormal  -- 110/60 -- -- -- --   05/30/24 0213 98.3 °F (36.8 °C) 93 -- 126/73 -- -- -- --   05/30/24 0158 -- 80 -- 130/71 -- -- -- --   05/30/24 0142 -- 108 Abnormal  -- 137/79 -- -- -- --   05/30/24 0112 98.3 °F (36.8 °C) 103 -- 114/67 -- -- -- --   BP: Provider aware; sitting for epidural during a contraction at 05/30/24 0015   05/30/24 0014 -- 87 -- -- 100 % -- -- --   05/30/24 0004 98.3 °F (36.8 °C) -- -- -- -- -- -- --   05/29/24 2357 -- 87 -- 97/53 -- -- -- --   05/29/24 2342 -- 83 -- 97/56 -- -- -- --   05/29/24 2328 -- 91 -- 128/80 -- -- -- --   05/29/24 2312 -- 93 -- 126/79 -- -- -- --   05/29/24 2300 98.3 °F (36.8 °C) -- -- -- -- -- -- --   05/29/24 2259 -- 90 -- 119/79 -- -- -- --   05/29/24 2242 -- 93 -- 116/76 -- -- -- --   05/29/24 2228 -- 94 -- 118/79 -- -- -- --   05/29/24 2143 -- 108 Abnormal  -- 128/84 -- -- -- --   05/29/24 2128 -- 81 -- 108/60 -- -- -- --   05/29/24 2112 98.6 °F (37 °C) 85 -- 101/71 -- -- -- --   05/29/24 2012 97.7 °F (36.5 °C) -- -- 100/52 -- -- -- --   05/29/24 1845 98.3 °F (36.8 °C) -- -- -- -- -- -- --   05/29/24 1839 -- 79 -- 96/51 -- -- -- --   05/29/24 1808 -- 90 -- 106/55 -- -- -- --   05/29/24 1738 98 °F (36.7 °C) 87 -- 101/58 -- -- -- --   05/29/24 1709 -- 83 -- 114/57 -- -- -- --   05/29/24 1638 98.3 °F (36.8 °C) -- -- -- -- -- -- --   05/29/24 1307 -- 92 -- 109/61 -- -- -- --   05/29/24 0823 -- 94 -- 115/74 -- -- -- --   05/29/24 0800 98.3 °F (36.8 °C) -- -- 95/54 -- -- -- --   05/29/24 0752 -- 96 -- -- -- -- -- --   05/29/24 0424 -- 97 -- 102/61 -- -- -- --   05/29/24 0409 98.1 °F (36.7 °C) -- -- -- -- -- -- --     Weights (last 14 days)    Date/Time Weight Weight Method Height   05/28/24 1500 92.5 kg (204 lb) Stated 5' 4\" (1.626 m)     Pertinent Labs/Diagnostic Test Results:   No orders to display         Results from last 7 days   Lab Units 05/28/24  1556   WBC Thousand/uL 8.81   HEMOGLOBIN g/dL 13.3   HEMATOCRIT % 38.3   PLATELETS Thousands/uL 158       " "  Results from last 7 days   Lab Units 05/30/24  0547   SODIUM mmol/L 130*   POTASSIUM mmol/L 4.1   CHLORIDE mmol/L 102   CO2 mmol/L 19*   ANION GAP mmol/L 9   BUN mg/dL 4*   CREATININE mg/dL 0.69   EGFR ml/min/1.73sq m 113   CALCIUM mg/dL 8.5     Results from last 7 days   Lab Units 05/30/24  0547   AST U/L 39   ALT U/L 30   ALK PHOS U/L 157*   TOTAL PROTEIN g/dL 6.0*   ALBUMIN g/dL 3.0*   TOTAL BILIRUBIN mg/dL 0.60         Results from last 7 days   Lab Units 05/30/24  0547   GLUCOSE RANDOM mg/dL 136             No results found for: \"BETA-HYDROXYBUTYRATE\"           ED Treatment:   Medication Administration - No Administrations Displayed (No Start Event Found)       None          Past Medical History:   Diagnosis Date    Fibroids     Herpes      Present on Admission:   Genital herpes   Uterine fibroid during pregnancy, antepartum   Not immune to measles      Admitting Diagnosis: Decreased fetal movement [O36.8190]  Age/Sex: 34 y.o. female  Admission Orders:  Continuous external fetal HR monitoring    Scheduled Medications:  docusate sodium, 100 mg, Oral, BID  ibuprofen, 600 mg, Oral, Q6H  measles-mumps-rubella, 0.5 mL, Subcutaneous, Once  oxytocin, 250 evan-units/min, Intravenous, Once    5/28 x2=  miSOPROStol (Cytotec) split tablet 25 mcg  Dose: 25 mcg  Freq: Once Route: VA  Start: 05/28/24 1630 End: 05/28/24 1707   Admin Instructions:             1707           miSOPROStol (Cytotec) split tablet 50 mcg  Dose: 50 mcg  Freq: Once Route: PO  Start: 05/28/24 2130 End: 05/28/24 2132                  IV 5/28> 5/30 DC 0837=  penicillin G (PFIZERPEN-G) in 0.9% sodium chloride 250 mL IVPB 5 Million Units  Dose: 5 Million Units  Freq: Once Route: IV  Last Dose: Stopped (05/28/24 1835)  Start: 05/28/24 1600 End: 05/28/24 1835 & Start: 05/28/24 1954 End: 05/30/24 0337     Continuous IV Infusions:  lactated ringers, 125 mL/hr, Intravenous, Continuous    oxytocin (PITOCIN) 30 Units in lactated ringers 500 mL infusion  Rate: " 1-30 mL/hr Dose: 1-30 evan-units/min  Freq: Titrated Route: IV  Last Dose: 10 evan-units/min (05/30/24 0330)  Start: 05/29/24 0300 End: 05/30/24 0337    ropivacaine 0.2% PCEA  Continuous Rate: 10 mL/hr  PCEA Dose: 5 mL  PCEA Lock-out: 10 Minutes  Number of Doses per Hour: 3 doses/hr  Freq: Continuous Route: EP  Indications of Use: LABOR PAIN  Start: 05/30/24 0015 End: 05/30/24 0337     PRN Meds:  acetaminophen, 650 mg, Oral, Q4H PRN  benzocaine-menthol-lanolin-aloe, 1 Application, Topical, Q6H PRN  bupivacaine (PF), 30 mL, Infiltration, Once PRN  calcium carbonate, 1,000 mg, Oral, Daily PRN  diphenhydrAMINE, 25 mg, Oral, Q6H PRN  hydrocortisone, 1 Application, Topical, Daily PRN  ondansetron, 4 mg, Intravenous, Q8H PRN  witch hazel-glycerin, 1 Pad, Topical, Q4H PRN      Network Utilization Review Department  ATTENTION: Please call with any questions or concerns to [phone] and carefully listen to the prompts so that you are directed to the right person. All voicemails are confidential.   For Discharge needs, contact Care Management DC Support Team at [phone] opt. 2  Send all requests for admission clinical reviews, approved or denied determinations and any other requests to dedicated fax number below belonging to the [place] where the patient is receiving treatment. List of dedicated fax numbers for the Facilities:  FACILITY NAME UR FAX NUMBER   ADMISSION DENIALS (Administrative/Medical Necessity) [phone]   DISCHARGE SUPPORT TEAM (NETWORK) [phone]   PARENT CHILD HEALTH (Maternity/NICU/Pediatrics) [phone]   [facility] [phone]   [facility] [phone]   [facility] [phone]   General acute hospital [phone]   [facility] [phone]   [facility] [phone]   [facility] [phone]   [facility]  [facility] [phone]   [facility] [phone]   [facility] [phone]   [facility] [phone]   [facility] [phone]

## 2024-06-01 ENCOUNTER — LACTATION ENCOUNTER (OUTPATIENT)
Dept: NURSERY | Facility: HOSPITAL | Age: 34
End: 2024-06-01

## 2024-06-01 NOTE — LACTATION NOTE
This note was copied from a baby's chart.  Met with Kajal who is scheduled for discharge to home with her baby boy today.    A latch assessment was done. Mom was able to latch her baby at the breast independently just needed some positioning assistance. Reported a much more comfortable latch with position change.      Feeding Plan:  1. Meet early feeding cues.  2. Bring baby to breast skin to skin.  3  Position baby up at chest level using pillows for support.  4.Baby's ear, shoulder, hip in good alignment. Baby all the way over belly to belly.  5. Bring baby to breast,not breast to baby ( no hunching over ).  6.Align nose to nipple and drag nipple down to chin to achieve a wide open mouth. Both baby's cheeks should be touching mom's breast when latched.  7. Use breast compressions to stimulate suck.  8. Initial latch on pain should last less then a minute (Baby's cheeks should not be puckered and you should not hear any clicking sounds).  9. Offer baby both breasts at feedings.    Kajal reports that the Discharge Breastfeeding Booklet was reviewed with her yesterday. She has no additional questions at this time.    She has the [facility] Information for follow up breastfeeding support as needed.

## 2024-06-03 NOTE — UTILIZATION REVIEW
NOTIFICATION OF ADMISSION DISCHARGE   This is a Notification of Discharge from [facility]. Please be advised that this patient has been discharge from our facility. Below you will find the admission and discharge date and time including the patient’s disposition.   UTILIZATION REVIEW CONTACT:  Vickie Osullivan  Utilization   Network Utilization Review Department  Phone: [phone] x carefully listen to the prompts. All voicemails are confidential.  Email: [email or web address]     ADMISSION INFORMATION  PRESENTATION DATE: 5/28/2024  2:42 PM  OBERVATION ADMISSION DATE:   INPATIENT ADMISSION DATE: 5/28/24  3:55 PM   DISCHARGE DATE: 5/31/2024  2:49 PM   DISPOSITION:Home/Self Care    Network Utilization Review Department  ATTENTION: Please call with any questions or concerns to [phone] and carefully listen to the prompts so that you are directed to the right person. All voicemails are confidential.   For Discharge needs, contact Care Management DC Support Team at [phone] opt. 2  Send all requests for admission clinical reviews, approved or denied determinations and any other requests to dedicated fax number below belonging to the campus where the patient is receiving treatment. List of dedicated fax numbers for the Facilities:  FACILITY NAME UR FAX NUMBER   ADMISSION DENIALS (Administrative/Medical Necessity) [phone]   DISCHARGE SUPPORT TEAM ([facility]) [phone]   PARENT CHILD HEALTH (Maternity/NICU/Pediatrics) [phone]   [facility] [phone]   [facility] [phone]   Critical access hospital [phone]   [facility] [phone]   [facility] [phone]   General acute hospital [phone]   [facility] [phone]   [facility] [phone]   [facility]  [facility] [phone]   [facility] [phone]   [facility] [phone]   [facility] [phone]

## 2024-06-07 LAB — PLACENTA IN STORAGE: NORMAL

## 2024-06-17 ENCOUNTER — NURSE TRIAGE (OUTPATIENT)
Age: 34
End: 2024-06-17

## 2024-06-17 DIAGNOSIS — N61.0 MASTITIS: Primary | ICD-10-CM

## 2024-06-17 NOTE — TELEPHONE ENCOUNTER
"Patient calling to report possible mastitis. She states she is breast feeing and pumping. She notes that her right breast feels heavy and swollen with multiple lumps. 7/10 pain. She has been checking her temp and has had a low grade temp - current 99.9. She has had body aches, sweats, and chills. Denies redness or streaking. Recommended tylenol/ibuprofen to help with pain, warm compress to breast, frequent emptying of breast, latching baby to affected side first for stronger suck with eagerness, haakaa use and haakaa epsom salt soaks, and massage. Patient has post partum appointment with Giovanni Gregg Thursday. HP in basket to Giovanni Gregg and ESC to make her aware of message. Awaiting return response.    Answer Assessment - Initial Assessment Questions  1. BREASTFEEDING: \"Are you currently breastfeeding?\" If No: \"When did you stop?\"      Breastfeeding and pumping  2. DELIVERY: \"When was the baby born?\"      5/30/2024  3. MAIN CONCERN: \"What is your main concern today?\" (e.g., breast symptoms, medication question)      Right breast heavy, feels swollen, intermittent body aches, chills, 99.9 Temporal temp  4. BREAST PAIN: \"Are you having breast pain?\" If Yes, ask: \"How bad is the pain?\" (Scale 1-10; or mild, moderate, severe). If Yes, ask: \"Which breast?\" (e.g., left, right, both)    - MILD - doesn't interfere with normal activities    - MODERATE - interferes with normal activities or awakens from sleep    - SEVERE - excruciating pain, unable to do any normal activities      7/10  5. REDNESS: \"Does the skin on the breast appear red?\"      None  6. FEVER: \"Do you have a fever?\" If Yes, ask: \"What is it, how was it measured, and when did it start?\"      Low grade temp, body aches, and chills  7. OTHER SYMPTOMS: \"Do you have any other symptoms?\" (e.g., abdominal pain, feeling sad or depressed, weakness)      Feels multiple lumps in breast    Protocols used: Postpartum - Breastfeeding Protocol Selection-ADULT-[place]    "

## 2024-06-17 NOTE — TELEPHONE ENCOUNTER
Spoke with patient and reviewed that meds were sent to pharmacy for suspected mastitis.  Patient aware to call if worsening or non improving symptoms.  Will plan to f/u on Thursday but will call sooner if needed.  Recommend ibuprofen for pain/ fever as an antiiflammatory will be helpful with mastitis.  Patient also with what sounds like may be a milk bleb.  Recommend warm soak, olive oil massage.

## 2024-06-17 NOTE — TELEPHONE ENCOUNTER
Patient called in. States she cannot do the 2:15 appt today and no other openings this afternoon. Patient reports a fever of 100.6 via forehead and 104 via neck with a non-contact probe this morning prior to taking Tylenol. Patient was informed that she will be treated still and will be receiving a phone call from Giovanni Gregg PA-C.

## 2024-06-17 NOTE — TELEPHONE ENCOUNTER
Rx dicloxicillin 500 QID sent to pharmacy.   Advil as needed.    Will f/u with patient at Thursday appt.  Patient to call sooner if no improvement or symptoms worsening instead of getting better.

## 2024-06-20 ENCOUNTER — POSTPARTUM VISIT (OUTPATIENT)
Dept: OBGYN CLINIC | Facility: CLINIC | Age: 34
End: 2024-06-20
Payer: COMMERCIAL

## 2024-06-20 VITALS
HEART RATE: 74 BPM | SYSTOLIC BLOOD PRESSURE: 118 MMHG | WEIGHT: 181 LBS | BODY MASS INDEX: 30.9 KG/M2 | HEIGHT: 64 IN | DIASTOLIC BLOOD PRESSURE: 74 MMHG | OXYGEN SATURATION: 99 %

## 2024-06-20 NOTE — PROGRESS NOTES
"The patient is a 34 y.o. who presents for a postpartum visit. She is 3 weeks postpartum following a vaginal delivery on 24. The delivery was at 39+ gestational weeks. Patient was admitted at 39w5d for or induction of labor for nonreassuring fetal heart tones.     Baby \" Daniel\" is doing well. Baby is feeding by breast.   Pediatrician: [facility]    Bleeding light. Bowel function is normal. Bladder function is normal. Patient elects condoms for contraception.      Patient called in 2 days ago with possible mastitis.  She was unable to come in for an appointment and was started on dicloxicillin.   Patient says she took 3 pills.  She then stopped because she had a headache.  She says she now feels fine.  She thinks it may have been a clogged duct.  Patient says + milk bleb that has been there for over a week and had a lump in breast behind bleb.  Lump now gone.  Pain, chills all resolved with resolution of lump.   Patient said she had been pumping more but now putting baby to breast more.   Offered lactation appt, patient declines.      Postpartum Depression: Low Risk  (2024)    [place]  Depression Scale     Last EPDS Total Score: 3     Last EPDS Self Harm Result: Never          ROS: negative     Current Outpatient Medications on File Prior to Visit   Medication Sig Dispense Refill    acetaminophen (TYLENOL) 325 mg tablet Take 2 tablets (650 mg total) by mouth every 4 (four) hours as needed for mild pain      benzocaine-menthol-lanolin-aloe (DERMOPLAST) 20-0.5 % topical spray Apply 1 Application topically every 6 (six) hours as needed for mild pain or irritation      dicloxacillin (DYNAPEN) 500 MG capsule Take 1 capsule (500 mg total) by mouth 4 (four) times a day for 10 days 40 capsule 0    docusate sodium (COLACE) 100 mg capsule Take 1 capsule (100 mg total) by mouth 2 (two) times a day      hydrocortisone 1 % cream Apply 1 Application topically daily as needed for irritation or rash      " ibuprofen (MOTRIN) 600 mg tablet Take 1 tablet (600 mg total) by mouth every 6 (six) hours as needed for mild pain 30 tablet 0    witch hazel-glycerin (TUCKS) topical pad Apply 1 Pad topically every 4 (four) hours as needed for irritation       No current facility-administered medications on file prior to visit.         Vitals:    06/20/24 1408   BP: 118/74   Pulse: 74   SpO2: 99%       Left breast- no masses, no discharge, no discoloration, no tenderness, no retraction, no dimpling, no thickening  Right breast- no masses, no discharge, no discoloration, no tenderness, no retraction, no dimpling, no thickening    Nodes- no cervical, subclavicular, or axillary node enlargement        Assessment:  Postpartum exam    Plan:  Return to full activity  Contraceptive options reviewed, patient elects mirena IUD.  Patient will continue to observe breasts if return of breast pain/ chills will restart and complete the dicloxicillin.  She will call me if this were to occur  Return to office 3 weeks for postpartum exam and IUD insertion

## 2024-06-28 ENCOUNTER — PATIENT MESSAGE (OUTPATIENT)
Dept: OBGYN CLINIC | Facility: CLINIC | Age: 34
End: 2024-06-28

## 2024-07-08 ENCOUNTER — POSTPARTUM VISIT (OUTPATIENT)
Dept: OBGYN CLINIC | Facility: CLINIC | Age: 34
End: 2024-07-08

## 2024-07-08 VITALS
DIASTOLIC BLOOD PRESSURE: 70 MMHG | SYSTOLIC BLOOD PRESSURE: 118 MMHG | HEIGHT: 64 IN | WEIGHT: 167.4 LBS | BODY MASS INDEX: 28.58 KG/M2 | OXYGEN SATURATION: 98 % | HEART RATE: 61 BPM

## 2024-07-08 DIAGNOSIS — M62.89 PELVIC FLOOR DYSFUNCTION IN FEMALE: ICD-10-CM

## 2024-07-08 PROBLEM — O34.10 UTERINE FIBROID DURING PREGNANCY, ANTEPARTUM: Status: RESOLVED | Noted: 2024-01-12 | Resolved: 2024-07-08

## 2024-07-08 PROBLEM — O28.8 NON-REACTIVE NST (NON-STRESS TEST): Status: RESOLVED | Noted: 2024-05-28 | Resolved: 2024-07-08

## 2024-07-08 PROBLEM — Z34.03 ENCOUNTER FOR SUPERVISION OF LOW-RISK FIRST PREGNANCY IN THIRD TRIMESTER: Status: RESOLVED | Noted: 2024-01-12 | Resolved: 2024-07-08

## 2024-07-08 PROBLEM — Z01.419 ENCOUNTER FOR ANNUAL ROUTINE GYNECOLOGICAL EXAMINATION: Status: RESOLVED | Noted: 2024-07-08 | Resolved: 2024-07-08

## 2024-07-08 PROBLEM — O99.820 GROUP B STREPTOCOCCAL CARRIAGE COMPLICATING PREGNANCY: Status: RESOLVED | Noted: 2024-05-08 | Resolved: 2024-07-08

## 2024-07-08 PROBLEM — Z01.419 ENCOUNTER FOR ANNUAL ROUTINE GYNECOLOGICAL EXAMINATION: Status: ACTIVE | Noted: 2024-07-08

## 2024-07-08 PROBLEM — D25.9 UTERINE FIBROID DURING PREGNANCY, ANTEPARTUM: Status: RESOLVED | Noted: 2024-01-12 | Resolved: 2024-07-08

## 2024-07-08 PROCEDURE — 99024 POSTOP FOLLOW-UP VISIT: CPT | Performed by: OBSTETRICS & GYNECOLOGY

## 2024-07-08 NOTE — ASSESSMENT & PLAN NOTE
5 wks PostPartum.  Normal postpartum exam.   The patient may advance activity as tolerated and may resume sexual activity at 6 wks PP.  Contraception discussed. Patient plans: condoms. Risks of short interval pregnancy reviewed  EDPS score 0. Signs and symptoms of postpartum depression reviewed  Up to date on pap smears  Referral provided for pelvic floor PT given reported urinary incontinence

## 2024-07-08 NOTE — PROGRESS NOTES
"   Patient ID: Kajal Ruiz Tristan is a 34 y.o. female.    Chief Complaint   Patient presents with    Postpartum Care     PPD- 0  Substance use- negative          She presents for routine postpartum visit.   She is now a  who is 5+wks s/p  on 24 - IOL at 39+ [facility]   Anesthesia: epidural   Perineum: 2nd degree laceration     gHTN diagnosed on admission to L&D     Postpartum course was uncomplicated. Since d/c home she has had no complaints.   She denies abn bleeding, pelvic pain, breast complaints, bowel/bladder dysfunction, depression/anx.   Baby BOY is thriving and is Breastfeeding    [place]  Depression Scale Total: 0  Plans for contraception: condoms    Some urinary urgency, some stress related leakage   Pap 2023 NILM/HPV (scanned in media)    The following portions of the patient's history were reviewed and updated as appropriate: allergies, current medications, past family history, past medical history, past social history, past surgical history, and problem list.    Review of Systems   Constitutional:  Negative for chills and fever.   Eyes:  Negative for visual disturbance.   Respiratory:  Negative for chest tightness and shortness of breath.    Cardiovascular:  Negative for chest pain.   Gastrointestinal:  Negative for abdominal pain, diarrhea, nausea and vomiting.   Genitourinary:  Negative for pelvic pain and vaginal bleeding.        As noted in HPI   Skin:  Negative for rash.   Neurological:  Negative for headaches.   All other systems reviewed and are negative.               Objective:  /70   Pulse 61   Ht 5' 4\" (1.626 m)   Wt 75.9 kg (167 lb 6.4 oz)   LMP 2023 (Exact Date)   SpO2 98%   BMI 28.73 kg/m²     Physical Exam  Constitutional:       General: She is not in acute distress.     Appearance: Normal appearance. She is not ill-appearing.   Genitourinary:      Bladder and urethral meatus normal.      Right Labia: No rash, tenderness, lesions or skin " changes.     Left Labia: No tenderness, lesions, skin changes or rash.     No labial fusion noted.      No inguinal adenopathy present in the right or left side.     No vaginal discharge, erythema, tenderness, bleeding or ulceration.        Right Adnexa: not tender, not full and no mass present.     Left Adnexa: not tender, not full and no mass present.     No cervical motion tenderness, discharge, friability, lesion, polyp or eversion.      Uterus is not enlarged, fixed, tender or irregular.      No uterine mass detected.     Uterus is anteverted.      Pelvic exam was performed with patient in the lithotomy position.   HENT:      Head: Normocephalic.   Cardiovascular:      Rate and Rhythm: Normal rate.      Heart sounds: Normal heart sounds.   Pulmonary:      Effort: Pulmonary effort is normal. No accessory muscle usage or respiratory distress.   Abdominal:      General: There is no distension.      Palpations: Abdomen is soft. There is no mass.      Tenderness: There is no abdominal tenderness. There is no guarding or rebound.   Musculoskeletal:         General: Normal range of motion.      Cervical back: No rigidity.   Lymphadenopathy:      Lower Body: No right inguinal adenopathy. No left inguinal adenopathy.   Neurological:      General: No focal deficit present.      Mental Status: She is alert. Mental status is at baseline.   Skin:     General: Skin is warm and dry.   Psychiatric:         Mood and Affect: Mood normal.         Behavior: Behavior normal.   Vitals and nursing note reviewed. Exam conducted with a chaperone present.                   Postpartum Depression: Low Risk  (2024)    [place]  Depression Scale     Last EPDS Total Score: 0     Last EPDS Self Harm Result: Never         Assessment/Plan:    Problem List Items Addressed This Visit       Encounter for routine postpartum follow-up - Primary     5 wks PostPartum.  Normal postpartum exam.   The patient may advance activity as  tolerated and may resume sexual activity at 6 wks PP.  Contraception discussed. Patient plans: condoms. Risks of short interval pregnancy reviewed  EDPS score 0. Signs and symptoms of postpartum depression reviewed  Up to date on pap smears  Referral provided for pelvic floor PT given reported urinary incontinence           Other Visit Diagnoses       Pelvic floor dysfunction in female        Relevant Orders    Ambulatory Referral to Physical Therapy

## 2024-10-10 ENCOUNTER — TELEPHONE (OUTPATIENT)
Age: 34
End: 2024-10-10

## 2024-10-10 NOTE — TELEPHONE ENCOUNTER
Patients Name: Kajal Tristan  : 1990  Phone Number: [phone]  Appointment Date: 10/15/2024  Appointment time: 3:00 pm   Address: [address] [facility] [address]  Drop off Facility[facility]Office Name: [facility]    Note for scheduling team: Pt was told during her pregnancy that she needs MMR vaccine and is looking to get that vaccine - pt did not want to est care and only wanted vaccine - pt was advised that she would need to est care in order to get the vaccine

## 2024-10-31 ENCOUNTER — NURSE TRIAGE (OUTPATIENT)
Dept: OTHER | Facility: OTHER | Age: 34
End: 2024-10-31

## 2024-10-31 NOTE — TELEPHONE ENCOUNTER
Additional Information  • Breastfeeding questions about mother (breast symptoms or feeling sick)    Protocols used: Postpartum - Breastfeeding Guideline Selection-Adult-AH

## 2024-10-31 NOTE — TELEPHONE ENCOUNTER
"Regarding: Vaccine questions / Breastfeeding  ----- Message from Gifty OWUSU sent at 10/31/2024  6:17 PM EDT -----  \"I am currently breastfeeding, it is okay for me to have the flu shot and Hep B vaccine? Pt is currently at the pharmacy.\"    "

## 2024-11-01 NOTE — TELEPHONE ENCOUNTER
"Reason for Disposition  • [1] Caller requesting NON-URGENT health information AND [2] PCP's office is the best resource    Answer Assessment - Initial Assessment Questions  1. REASON FOR CALL: \"What is the main reason for your call?\" or \"How can I best help you?\" 'I got a Hep B vaccine tonight. I am scheduled for tomorrow night at 1830 for my Flu vaccine. I want to know if I should wait longer between the two vaccines or can I get it tomorrow evening?\"    Protocols used: Information Only Call - No Triage-Adult-    "

## 2024-11-01 NOTE — TELEPHONE ENCOUNTER
Call to pt and reviewed that if vaccines were not given at the same time, to wait 2 weeks in between them. She verbalized understanding and is thankful.

## 2024-11-01 NOTE — TELEPHONE ENCOUNTER
Please call patient back in the morning regarding her question if she can get her Flu Vaccine tomorrow evening at 1830 after getting her Hep B vaccine tonight? Or would it be better to wait a few days between them?

## 2024-11-11 ENCOUNTER — TELEPHONE (OUTPATIENT)
Age: 34
End: 2024-11-11

## 2024-11-11 NOTE — TELEPHONE ENCOUNTER
Provider recommendations came through after call was already disconnected.     Called patient to discuss response, however no answer- line just kept ringing, no option for VM.

## 2024-11-11 NOTE — TELEPHONE ENCOUNTER
Patient called in requesting an update to her question about these vaccines. Advised patient we are still waiting for a response from the provider and will be in touch.

## 2024-11-11 NOTE — TELEPHONE ENCOUNTER
MMR, tdap, flu vaccines are all safe to get while breastfeeding.  She should discuss with proivder giving the vaccines to decide what is given at what timing.

## 2024-11-11 NOTE — TELEPHONE ENCOUNTER
Pt called. Pt stated she is scheduled for her Flu vaccine 11/15, pt also said she is getting her MMR and TDAP. Pt would like to be advise on what's best to do since she is breastfeeding. Get all vaccines together or wait a certain time frame between vaccine? Please advise.       Pt speifically wanted to ask Giovanni Gregg PA-C